# Patient Record
Sex: FEMALE | Race: WHITE | Employment: FULL TIME | URBAN - METROPOLITAN AREA
[De-identification: names, ages, dates, MRNs, and addresses within clinical notes are randomized per-mention and may not be internally consistent; named-entity substitution may affect disease eponyms.]

---

## 2017-11-03 ENCOUNTER — HOSPITAL ENCOUNTER (OUTPATIENT)
Dept: ULTRASOUND IMAGING | Facility: HOSPITAL | Age: 42
Discharge: HOME | End: 2017-11-03
Attending: OBSTETRICS & GYNECOLOGY
Payer: COMMERCIAL

## 2017-11-03 ENCOUNTER — HOSPITAL ENCOUNTER (OUTPATIENT)
Dept: MAMMOGRAPHY | Facility: HOSPITAL | Age: 42
Discharge: HOME | End: 2017-11-03
Attending: OBSTETRICS & GYNECOLOGY
Payer: COMMERCIAL

## 2017-11-03 DIAGNOSIS — N64.52 DISCHARGE FROM RIGHT NIPPLE: ICD-10-CM

## 2017-11-03 PROCEDURE — 76642 ULTRASOUND BREAST LIMITED: CPT | Performed by: OBSTETRICS & GYNECOLOGY

## 2017-11-03 PROCEDURE — 77066 DX MAMMO INCL CAD BI: CPT | Performed by: OBSTETRICS & GYNECOLOGY

## 2018-04-20 ENCOUNTER — HOSPITAL ENCOUNTER (OUTPATIENT)
Dept: ULTRASOUND IMAGING | Facility: HOSPITAL | Age: 43
Discharge: HOME | End: 2018-04-20
Attending: OBSTETRICS & GYNECOLOGY
Payer: COMMERCIAL

## 2018-04-20 ENCOUNTER — HOSPITAL ENCOUNTER (OUTPATIENT)
Dept: MAMMOGRAPHY | Facility: HOSPITAL | Age: 43
Discharge: HOME | End: 2018-04-20
Attending: OBSTETRICS & GYNECOLOGY
Payer: COMMERCIAL

## 2018-04-20 DIAGNOSIS — R92.8 ABNORMAL FINDINGS ON DIAGNOSTIC IMAGING OF BREAST: ICD-10-CM

## 2018-04-20 PROCEDURE — 77066 DX MAMMO INCL CAD BI: CPT | Performed by: OBSTETRICS & GYNECOLOGY

## 2018-04-20 PROCEDURE — 77062 BREAST TOMOSYNTHESIS BI: CPT | Performed by: OBSTETRICS & GYNECOLOGY

## 2018-04-20 PROCEDURE — 76642 ULTRASOUND BREAST LIMITED: CPT | Performed by: OBSTETRICS & GYNECOLOGY

## 2018-04-23 ENCOUNTER — NURSE NAVIGATOR ENCOUNTER (OUTPATIENT)
Dept: HEMATOLOGY/ONCOLOGY | Facility: HOSPITAL | Age: 43
End: 2018-04-23

## 2018-04-23 RX ORDER — ALBUTEROL SULFATE 90 UG/1
AEROSOL, METERED RESPIRATORY (INHALATION) EVERY 6 HOURS PRN
COMMUNITY

## 2018-04-23 RX ORDER — FLUTICASONE PROPIONATE 50 MCG
SPRAY, SUSPENSION (ML) NASAL DAILY
COMMUNITY

## 2018-05-02 ENCOUNTER — HOSPITAL ENCOUNTER (OUTPATIENT)
Dept: MAMMOGRAPHY | Facility: HOSPITAL | Age: 43
Discharge: HOME | End: 2018-05-02
Attending: OBSTETRICS & GYNECOLOGY
Payer: COMMERCIAL

## 2018-05-02 ENCOUNTER — HOSPITAL ENCOUNTER (OUTPATIENT)
Dept: ULTRASOUND IMAGING | Facility: HOSPITAL | Age: 43
Discharge: HOME | End: 2018-05-02
Attending: OBSTETRICS & GYNECOLOGY
Payer: COMMERCIAL

## 2018-05-02 VITALS — SYSTOLIC BLOOD PRESSURE: 127 MMHG | HEART RATE: 60 BPM | RESPIRATION RATE: 16 BRPM | DIASTOLIC BLOOD PRESSURE: 70 MMHG

## 2018-05-02 DIAGNOSIS — N63.20 BREAST MASS, LEFT: ICD-10-CM

## 2018-05-02 PROCEDURE — 77065 DX MAMMO INCL CAD UNI: CPT | Performed by: OBSTETRICS & GYNECOLOGY

## 2018-05-02 PROCEDURE — 88173 CYTOPATH EVAL FNA REPORT: CPT | Performed by: OBSTETRICS & GYNECOLOGY

## 2018-05-02 PROCEDURE — 76942 ECHO GUIDE FOR BIOPSY: CPT | Performed by: OBSTETRICS & GYNECOLOGY

## 2018-05-02 PROCEDURE — 10022 US BREAST FINE NEEDLE ASPIRATION W GUIDE (CPT=10022/76942): CPT | Performed by: OBSTETRICS & GYNECOLOGY

## 2018-05-02 PROCEDURE — 88172 CYTP DX EVAL FNA 1ST EA SITE: CPT | Performed by: OBSTETRICS & GYNECOLOGY

## 2018-05-02 PROCEDURE — 88305 TISSUE EXAM BY PATHOLOGIST: CPT | Performed by: OBSTETRICS & GYNECOLOGY

## 2018-05-02 PROCEDURE — 88177 CYTP FNA EVAL EA ADDL: CPT | Performed by: OBSTETRICS & GYNECOLOGY

## 2018-05-02 NOTE — PROCEDURES
[facility]  Procedure Note    [address] [facility] Patient Status:  Outpatient    3/16/1975 MRN [id number]   Location [address] [facility] Attending Janelle Carbajal MD   Hosp Day # 0 PCP Jamil Francois MD     Procedure: Dez Yang

## 2018-05-02 NOTE — IMAGING NOTE
PT ARRIVED TO ROOM 4.  SCANS BY ALINE [facility]     HX TAKEN PROCEDURE EXPLAINED QUESTIONS ANSWERED  Status post hx of left breast biopsy for benign finding. Pt had a previous hx of right nipple discharge.     PT CONSENTED AT [address] [place] [address],[address] [facility]

## 2018-10-24 ENCOUNTER — HOSPITAL ENCOUNTER (OUTPATIENT)
Dept: MAMMOGRAPHY | Facility: HOSPITAL | Age: 43
Discharge: HOME | End: 2018-10-24
Attending: OBSTETRICS & GYNECOLOGY
Payer: COMMERCIAL

## 2018-10-24 DIAGNOSIS — R92.8 OTH ABN AND INCONCLUSIVE FINDINGS ON DX IMAGING OF BREAST: ICD-10-CM

## 2018-10-24 PROCEDURE — 77062 BREAST TOMOSYNTHESIS BI: CPT | Performed by: OBSTETRICS & GYNECOLOGY

## 2018-10-24 PROCEDURE — 77066 DX MAMMO INCL CAD BI: CPT | Performed by: OBSTETRICS & GYNECOLOGY

## 2019-12-17 ENCOUNTER — HOSPITAL ENCOUNTER (OUTPATIENT)
Dept: MAMMOGRAPHY | Facility: HOSPITAL | Age: 44
Discharge: HOME | End: 2019-12-17
Attending: OBSTETRICS & GYNECOLOGY
Payer: COMMERCIAL

## 2019-12-17 DIAGNOSIS — R92.8 OTHER ABNORMAL AND INCONCLUSIVE FINDINGS ON DIAGNOSTIC IMAGING OF BREAST: ICD-10-CM

## 2019-12-17 PROCEDURE — 77062 BREAST TOMOSYNTHESIS BI: CPT | Performed by: OBSTETRICS & GYNECOLOGY

## 2019-12-17 PROCEDURE — 77066 DX MAMMO INCL CAD BI: CPT | Performed by: OBSTETRICS & GYNECOLOGY

## 2020-09-05 NOTE — PROGRESS NOTES
Breast RN Navigator phoned patient to discuss recommendation for left breast biopsy. Message left 4/23/18 at 0830 requesting a return call at patient's convenience. Return call received from patient, and VMM left at 1022 4/23/18.   Attempted to reach pa signed   that some soreness may occur after biopsy. Discussed use of a supportive bra and ice packs after procedure, to decrease soreness.     Discussed with patient no swimming, bathing, or submerging underwater until the incision is closed and healed, or about 5

## 2021-06-29 ENCOUNTER — OFFICE VISIT (OUTPATIENT)
Dept: NEUROLOGY | Facility: CLINIC | Age: 46
End: 2021-06-29
Payer: COMMERCIAL

## 2021-06-29 VITALS — BODY MASS INDEX: 24.96 KG/M2 | OXYGEN SATURATION: 97 % | HEIGHT: 67 IN | WEIGHT: 159 LBS | HEART RATE: 87 BPM

## 2021-06-29 DIAGNOSIS — F32.A DEPRESSION, UNSPECIFIED DEPRESSION TYPE: ICD-10-CM

## 2021-06-29 DIAGNOSIS — R12 HEARTBURN: ICD-10-CM

## 2021-06-29 DIAGNOSIS — F41.9 ANXIETY: ICD-10-CM

## 2021-06-29 DIAGNOSIS — M54.12 CERVICAL RADICULOPATHY: Primary | ICD-10-CM

## 2021-06-29 DIAGNOSIS — G47.00 INSOMNIA, UNSPECIFIED TYPE: ICD-10-CM

## 2021-06-29 PROBLEM — J30.1 CHRONIC SEASONAL ALLERGIC RHINITIS DUE TO POLLEN: Status: ACTIVE | Noted: 2018-09-13

## 2021-06-29 PROBLEM — J45.909 ASTHMA (HCC): Status: ACTIVE | Noted: 2021-06-29

## 2021-06-29 PROBLEM — J45.909 ASTHMA: Status: ACTIVE | Noted: 2021-06-29

## 2021-06-29 PROCEDURE — 3008F BODY MASS INDEX DOCD: CPT | Performed by: PHYSICAL MEDICINE & REHABILITATION

## 2021-06-29 PROCEDURE — 99244 OFF/OP CNSLTJ NEW/EST MOD 40: CPT | Performed by: PHYSICAL MEDICINE & REHABILITATION

## 2021-06-29 RX ORDER — SERTRALINE HYDROCHLORIDE 100 MG/1
TABLET, FILM COATED ORAL
COMMUNITY
Start: 2021-03-18

## 2021-06-29 RX ORDER — ETODOLAC 500 MG/1
TABLET, FILM COATED ORAL
COMMUNITY
Start: 2021-06-07 | End: 2021-07-21

## 2021-06-29 RX ORDER — DEXAMETHASONE 4 MG/1
TABLET ORAL
COMMUNITY
Start: 2021-03-18

## 2021-06-29 RX ORDER — MONTELUKAST SODIUM 10 MG/1
TABLET ORAL
COMMUNITY

## 2021-06-29 RX ORDER — ALPRAZOLAM 0.25 MG/1
TABLET ORAL
COMMUNITY
Start: 2021-03-18

## 2021-06-29 RX ORDER — CYCLOBENZAPRINE HCL 10 MG
10 TABLET ORAL NIGHTLY
Qty: 30 TABLET | Refills: 0 | Status: SHIPPED | OUTPATIENT
Start: 2021-06-29 | End: 2021-07-21

## 2021-06-29 NOTE — PROGRESS NOTES
[address]  Progress Note    CHIEF COMPLAINT:  Patient presents with:  Neck Pain: New right handed pt. Neck pain started 4/21. Radiates left side N/T to finger digit 1.  Had PT which made it worse, has MRI, n Glasses of wine per week        Comment: 1 x weekly      Drug use: Never      Sexual activity: Not on file      CURRENT MEDICATIONS:   Current Outpatient Medications   Medication Sig Dispense Refill   • cyclobenzaprine 10 MG Oral Tab Take 1 tablet (10 mg t Atraumatic  Extremities: No upper extremity edema bilaterally. Peripheral pulses intact. Spine: limited painfree cervical ROM in all directions, TTP all neck muscles, extension brings on arm pain.   Shoulders: full and painfree ROM   Neuro:   Cognition: al relatively contraindicated due to concurrent psychotropic medication use. 5. Depression, unspecified depression type  As above        RTC:    Return in about 3 weeks (around 7/20/2021).        Discharge Instructions were provided as documented in AVS sum

## 2021-07-08 ENCOUNTER — MED REC SCAN ONLY (OUTPATIENT)
Dept: NEUROLOGY | Facility: CLINIC | Age: 46
End: 2021-07-08

## 2021-07-16 ENCOUNTER — PATIENT MESSAGE (OUTPATIENT)
Dept: NEUROLOGY | Facility: CLINIC | Age: 46
End: 2021-07-16

## 2021-07-19 NOTE — TELEPHONE ENCOUNTER
From: Paige Ramsey  To: Sharon De Luna MD  Sent: 7/16/2021 2:47 PM CDT  Subject: Other    I am scheduled to come in next week. I have been unable to do the physical therapy due to the timing of her first available appointment.  Should I still come in

## 2021-07-21 ENCOUNTER — OFFICE VISIT (OUTPATIENT)
Dept: NEUROLOGY | Facility: CLINIC | Age: 46
End: 2021-07-21
Payer: COMMERCIAL

## 2021-07-21 VITALS
HEIGHT: 67 IN | OXYGEN SATURATION: 98 % | HEART RATE: 117 BPM | DIASTOLIC BLOOD PRESSURE: 70 MMHG | BODY MASS INDEX: 25.11 KG/M2 | SYSTOLIC BLOOD PRESSURE: 128 MMHG | WEIGHT: 160 LBS

## 2021-07-21 DIAGNOSIS — M54.12 CERVICAL RADICULOPATHY: Primary | ICD-10-CM

## 2021-07-21 DIAGNOSIS — R12 HEARTBURN: ICD-10-CM

## 2021-07-21 DIAGNOSIS — F32.A DEPRESSION, UNSPECIFIED DEPRESSION TYPE: ICD-10-CM

## 2021-07-21 DIAGNOSIS — G47.00 INSOMNIA, UNSPECIFIED TYPE: ICD-10-CM

## 2021-07-21 DIAGNOSIS — F41.9 ANXIETY: ICD-10-CM

## 2021-07-21 PROCEDURE — 99214 OFFICE O/P EST MOD 30 MIN: CPT | Performed by: PHYSICAL MEDICINE & REHABILITATION

## 2021-07-21 PROCEDURE — 3078F DIAST BP <80 MM HG: CPT | Performed by: PHYSICAL MEDICINE & REHABILITATION

## 2021-07-21 PROCEDURE — 3008F BODY MASS INDEX DOCD: CPT | Performed by: PHYSICAL MEDICINE & REHABILITATION

## 2021-07-21 PROCEDURE — 3074F SYST BP LT 130 MM HG: CPT | Performed by: PHYSICAL MEDICINE & REHABILITATION

## 2021-07-21 RX ORDER — DOXEPIN HYDROCHLORIDE 10 MG/1
CAPSULE ORAL
Qty: 60 CAPSULE | Refills: 0 | Status: SHIPPED | OUTPATIENT
Start: 2021-07-21 | End: 2021-08-23

## 2021-07-21 RX ORDER — MELOXICAM 15 MG/1
15 TABLET ORAL DAILY
Qty: 30 TABLET | Refills: 0 | Status: SHIPPED | OUTPATIENT
Start: 2021-07-21 | End: 2021-08-23

## 2021-07-21 NOTE — PROGRESS NOTES
[address] Amy Joseph  Progress Note    CHIEF COMPLAINT:  Patient presents with:  Neck Pain: LOV 6/29/21 Pt comes in for f/u appt. Unable to do PT, no injections. Takes Etolac for pain. Has N/T Brought MRI in today Rate History      Not on file    Tobacco Use      Smoking status: Never Smoker      Smokeless tobacco: Never Used    Substance and Sexual Activity      Alcohol use:  Yes        Alcohol/week: 1.0 standard drinks        Types: 1 Glasses of wine per week        Com denies  Tingling/Numbness: admits   Psychiatric             All other systems reviewed and are negative. Pertinent positives and negatives noted in the HPI. PHYSICAL EXAM:   /70   Pulse 117   Ht 67\"   Wt 160 lb (72.6 kg)   SpO2 98%   BMI 25. 0 risks and benefits. 3. Insomnia, unspecified type  Start doxepin, take 1 to 2 tablets and we may increase it next time. Should help radicular pain as well.     4. Anxiety  Tricyclic medications relatively contraindicated due to concurrent psychotropic m

## 2021-07-23 ENCOUNTER — TELEPHONE (OUTPATIENT)
Dept: NEUROLOGY | Facility: CLINIC | Age: 46
End: 2021-07-23

## 2021-07-24 ENCOUNTER — HOSPITAL ENCOUNTER (OUTPATIENT)
Dept: MAMMOGRAPHY | Facility: HOSPITAL | Age: 46
Discharge: HOME | End: 2021-07-24
Attending: OBSTETRICS & GYNECOLOGY
Payer: COMMERCIAL

## 2021-07-24 DIAGNOSIS — Z12.31 ENCOUNTER FOR SCREENING MAMMOGRAM FOR MALIGNANT NEOPLASM OF BREAST: ICD-10-CM

## 2021-07-24 PROCEDURE — 77067 SCR MAMMO BI INCL CAD: CPT | Performed by: OBSTETRICS & GYNECOLOGY

## 2021-07-24 PROCEDURE — 77063 BREAST TOMOSYNTHESIS BI: CPT | Performed by: OBSTETRICS & GYNECOLOGY

## 2021-08-03 ENCOUNTER — MED REC SCAN ONLY (OUTPATIENT)
Dept: NEUROLOGY | Facility: CLINIC | Age: 46
End: 2021-08-03

## 2021-08-23 RX ORDER — DOXEPIN HYDROCHLORIDE 10 MG/1
CAPSULE ORAL
Qty: 60 CAPSULE | Refills: 0 | Status: SHIPPED | OUTPATIENT
Start: 2021-08-23 | End: 2021-09-22

## 2021-08-23 RX ORDER — MELOXICAM 15 MG/1
TABLET ORAL
Qty: 30 TABLET | Refills: 0 | Status: SHIPPED | OUTPATIENT
Start: 2021-08-23

## 2021-08-23 NOTE — TELEPHONE ENCOUNTER
Medication request: meloxicam 15mg Take 1 tablet (15 mg total) by mouth daily.     [place]: 07/21/21 #30 r-0    Medication request: Doxepin 10mg 1-2 tablet orally every night as directed    [place]: 07/21/21 #60 r-0    LOV: 07/21/21  NOV

## 2021-09-15 ENCOUNTER — OFFICE VISIT (OUTPATIENT)
Dept: PHYSICAL MEDICINE AND REHAB | Facility: CLINIC | Age: 46
End: 2021-09-15
Payer: COMMERCIAL

## 2021-09-15 VITALS
BODY MASS INDEX: 25.11 KG/M2 | SYSTOLIC BLOOD PRESSURE: 120 MMHG | WEIGHT: 160 LBS | DIASTOLIC BLOOD PRESSURE: 60 MMHG | HEIGHT: 67 IN

## 2021-09-15 DIAGNOSIS — R12 HEARTBURN: ICD-10-CM

## 2021-09-15 DIAGNOSIS — G47.00 INSOMNIA, UNSPECIFIED TYPE: ICD-10-CM

## 2021-09-15 DIAGNOSIS — M54.12 CERVICAL RADICULOPATHY: Primary | ICD-10-CM

## 2021-09-15 PROCEDURE — 3008F BODY MASS INDEX DOCD: CPT | Performed by: PHYSICAL MEDICINE & REHABILITATION

## 2021-09-15 PROCEDURE — 3078F DIAST BP <80 MM HG: CPT | Performed by: PHYSICAL MEDICINE & REHABILITATION

## 2021-09-15 PROCEDURE — 99214 OFFICE O/P EST MOD 30 MIN: CPT | Performed by: PHYSICAL MEDICINE & REHABILITATION

## 2021-09-15 PROCEDURE — 3074F SYST BP LT 130 MM HG: CPT | Performed by: PHYSICAL MEDICINE & REHABILITATION

## 2021-09-15 NOTE — PROGRESS NOTES
[address] Amy Joseph  Progress Note    CHIEF COMPLAINT:  Patient presents with:  Neck Pain: pt is here for f/u neck pain. LOV 7/21/21. pt states is 95% improvement since been going to PT.  rates pain 2/10.         His Sexual Activity      Alcohol use:  Yes        Alcohol/week: 1.0 standard drink        Types: 1 Glasses of wine per week        Comment: 1 x weekly      Drug use: Never      Sexual activity: Not on file      CURRENT MEDICATIONS:   Current Outpatient [address] [place] [address] Imagin. I reviewed a cervical MRI from May 2021 showing a left C5-6 disc osteophyte causing foraminal and mild central canal stenosis. There is some hypertrophy of the PLL in front of the C6 vertebral body.   No cord signal abnormality, but there is

## 2021-09-17 RX ORDER — MELOXICAM 15 MG/1
TABLET ORAL
Qty: 30 TABLET | Refills: 0 | OUTPATIENT
Start: 2021-09-17

## 2021-09-17 NOTE — TELEPHONE ENCOUNTER
Medication request: MELOXICAM 15 MG Oral Tab  Sig:   TAKE 1 TABLET BY MOUTH EVERY DAY    LOV: 9/15/21  NOV: None    [place] 8/23/21 qty#30 r-0

## 2021-09-21 NOTE — TELEPHONE ENCOUNTER
Medication request: Doxepin 10mg 1-2 tablet orally every night as directed     [place]: 8/23/21 qty#60 r-0     LOV: 9/15/21  NOV: None

## 2021-09-22 RX ORDER — DOXEPIN HYDROCHLORIDE 10 MG/1
CAPSULE ORAL
Qty: 60 CAPSULE | Refills: 0 | Status: SHIPPED | OUTPATIENT
Start: 2021-09-22 | End: 2021-12-13

## 2021-12-13 RX ORDER — DOXEPIN HYDROCHLORIDE 10 MG/1
CAPSULE ORAL NIGHTLY
Qty: 60 CAPSULE | Refills: 1 | Status: SHIPPED | OUTPATIENT
Start: 2021-12-13

## 2021-12-13 NOTE — TELEPHONE ENCOUNTER
Medication request: DOXEPIN 10 MG Oral Cap  Si-2 TABLET ORALLY EVERY NIGHT AS DIRECTED    LOV:9/15/2021  NOV: none    [place]2021  Q-60 R-0

## 2022-03-04 ENCOUNTER — TELEPHONE (OUTPATIENT)
Dept: PHYSICAL MEDICINE AND REHAB | Facility: CLINIC | Age: 47
End: 2022-03-04

## 2023-10-16 ENCOUNTER — OFFICE VISIT (OUTPATIENT)
Dept: INTERNAL MEDICINE CLINIC | Facility: CLINIC | Age: 48
End: 2023-10-16

## 2023-10-16 VITALS
TEMPERATURE: 99 F | HEIGHT: 67 IN | DIASTOLIC BLOOD PRESSURE: 82 MMHG | WEIGHT: 173 LBS | OXYGEN SATURATION: 98 % | HEART RATE: 75 BPM | BODY MASS INDEX: 27.15 KG/M2 | SYSTOLIC BLOOD PRESSURE: 136 MMHG

## 2023-10-16 DIAGNOSIS — E53.8 B12 DEFICIENCY: ICD-10-CM

## 2023-10-16 DIAGNOSIS — Z00.00 ANNUAL PHYSICAL EXAM: Primary | ICD-10-CM

## 2023-10-16 DIAGNOSIS — Z12.11 SCREENING FOR COLON CANCER: ICD-10-CM

## 2023-10-16 DIAGNOSIS — Z12.31 ENCOUNTER FOR SCREENING MAMMOGRAM FOR MALIGNANT NEOPLASM OF BREAST: ICD-10-CM

## 2023-10-16 PROCEDURE — 99386 PREV VISIT NEW AGE 40-64: CPT | Performed by: INTERNAL MEDICINE

## 2023-10-16 PROCEDURE — 90686 IIV4 VACC NO PRSV 0.5 ML IM: CPT | Performed by: INTERNAL MEDICINE

## 2023-10-16 PROCEDURE — 3008F BODY MASS INDEX DOCD: CPT | Performed by: INTERNAL MEDICINE

## 2023-10-16 PROCEDURE — 3079F DIAST BP 80-89 MM HG: CPT | Performed by: INTERNAL MEDICINE

## 2023-10-16 PROCEDURE — 90471 IMMUNIZATION ADMIN: CPT | Performed by: INTERNAL MEDICINE

## 2023-10-16 PROCEDURE — 3075F SYST BP GE 130 - 139MM HG: CPT | Performed by: INTERNAL MEDICINE

## 2023-10-16 RX ORDER — BUPROPION HCL 300 MG
1 TABLET, EXTENDED RELEASE 24 HR ORAL DAILY
COMMUNITY
Start: 2023-04-01

## 2023-10-16 RX ORDER — ALPRAZOLAM 0.25 MG/1
0.25 TABLET ORAL NIGHTLY PRN
Qty: 30 TABLET | Refills: 0 | Status: SHIPPED | OUTPATIENT
Start: 2023-10-16

## 2023-11-27 ENCOUNTER — PATIENT MESSAGE (OUTPATIENT)
Dept: INTERNAL MEDICINE CLINIC | Facility: CLINIC | Age: 48
End: 2023-11-27

## 2023-11-28 NOTE — TELEPHONE ENCOUNTER
From: Francisco Bradley  To: Shoshana Herron  Sent: 11/27/2023 2:07 PM CST  Subject: Scheduling bloodwork    I am supposed to get bloodwork done but I can't tell how to do that. Where do I go? Do I need an appointment?

## 2023-11-28 NOTE — TELEPHONE ENCOUNTER
Medications listedd as external. Clarification sent regarding sig. Await response.     Future Appointments   Date Time Provider Wilfredo Rosemarie   3/5/2024 11:40 AM [facility] RM1 [facility] EM [facility]

## 2023-11-28 NOTE — TELEPHONE ENCOUNTER
From: Josh Ernst  To: Treasuremichi Arshad  Sent: 11/27/2023 10:34 AM CST  Subject: Medication refills    My son broke my only remaining albuterol inhaler this weekend. I have been coughing a lot, which is usual for me when the weather is cold and dry. It is not offering it as a refill option on the Refill medications page. I could also use a refill on the Flovent though that is less urgent. I generally only take that in later Fall until SPring.

## 2023-11-30 ENCOUNTER — TELEPHONE (OUTPATIENT)
Dept: INTERNAL MEDICINE CLINIC | Facility: CLINIC | Age: 48
End: 2023-11-30

## 2023-11-30 ENCOUNTER — PATIENT OUTREACH (OUTPATIENT)
Dept: CASE MANAGEMENT | Age: 48
End: 2023-11-30

## 2023-11-30 RX ORDER — ALBUTEROL SULFATE 90 UG/1
2 AEROSOL, METERED RESPIRATORY (INHALATION)
Qty: 18 G | Refills: 3 | Status: CANCELLED | OUTPATIENT
Start: 2023-11-30

## 2023-11-30 NOTE — TELEPHONE ENCOUNTER
PCP showing Dr Richard Crump. RN generated PCP change request per protocol.      DR Karen Santos=new PCP

## 2023-11-30 NOTE — PROCEDURES
Received order requesting to update PCP to Dr. Yessy Lazo is Approved and finalized on November 30, 2023.     Thanks,  [facility]

## 2023-11-30 NOTE — TELEPHONE ENCOUNTER
Patient would like to know how to schedule for colonoscopy. Instructed patient to call [phone](MVN below ). See OTHER ORDERS 10/16/23; Referral Information:  Order Date: Oct 16, 2023  Expected Date: 10/23/2023  Expiration Date: Oct 15, 2024 Epic Order #: [id number]   Referral Type: [facility] Telephone Colon Screen Dx: Screening for colon cancer (Z12.11)   Signed Referral [place]:           Number of Visits: 1    Scheduling Information:  Please call [phone] to schedule a telephone screening appointment prior to your procedure.

## 2023-11-30 NOTE — TELEPHONE ENCOUNTER
Passes protocol but listed as External/historical, per patient both were prescribed by previous PCP . Refill passed per [place] [employer], [facility] protocol. Requested Prescriptions   Pending Prescriptions Disp Refills    fluticasone propionate (FLOVENT HFA) 110 MCG/ACT Inhalation Aerosol 12 g 3     Sig: TAKE 2 PUFFS BY MOUTH TWICE A DAY       Asthma & COPD Medication Protocol Passed - 11/30/2023  3:11 PM        Passed - In person appointment or virtual visit in the past 6 mos or appointment in next 3 mos     Recent Outpatient Visits              1 month ago Annual physical exam    [facility], Milton Campbell MD    Office Visit    2 years ago Cervical radiculopathy    [facility], Cristian Miranda MD    Office Visit    2 years ago Cervical radiculopathy    [facility], Amina Hatfield MD    Office Visit    2 years ago Cervical radiculopathy    [address] [facility], Cristian Miranda MD    Office Visit          Future Appointments         Provider Department Appt Notes    Tomorrow Chance [address] Dr. has submitted full list in [address]    In 3 months [facility] OF THE [place] [address] [address] [place] [address]                  albuterol 108 (90 Base) MCG/ACT Inhalation Aero Soln 18 g 3     Sig: Inhale 2 puffs into the lungs every 4 to 6 hours as needed for Wheezing.        Asthma & COPD Medication Protocol Passed - 11/30/2023  3:11 PM        Passed - In person appointment or virtual visit in the past 6 mos or appointment in next 3 mos     Recent Outpatient Visits              1 month ago Annual physical exam    [facility], Milton Campbell MD    Office Visit    2 years ago Cervical radiculopathy    [address] [facility], Cristian Miranda MD    Office Visit    2 years ago Cervical radiculopathy    Ruben Kumar Benjie Gustin, MD    Office Visit    2 years ago Cervical radiculopathy    Ruben Kumar Benjie Gustin, MD    Office Visit          Future Appointments         Provider Department Appt Notes    Tomorrow Carroll [address] Dr[address] has submitted full list in [address]    In 3 months [facility] OF THE [facility] [address] [facility]                      Future Appointments         Provider Department Appt Notes    Tomorrow Sunita [address] Dr[address] has submitted full list in [address]    In 3 months [facility] OF THE [facility] [address]              Recent Outpatient Visits              1 month ago Annual physical exam    Loli Kumar MD    Office Visit    2 years ago Cervical radiculopathy    Niki Kumar MD    Office Visit    2 years ago Cervical radiculopathy    Niki Kumar MD    Office Visit    2 years ago Cervical radiculopathy    Ruben Kumar Benjie Gustin, MD    Office Visit

## 2023-11-30 NOTE — TELEPHONE ENCOUNTER
Patient called back, confirmed that the albuterol inhaler is 2 puffs every 4-6 hrs as needed. Stated that these 2 prescriptions (albuterol and Flovent ) were both prescribed to her by her previous PCP. And her new PCP now is Dr Michael Pierson. Preferred pharmacy verified. RN heard frequent cough over the phone,stated that she has the cough whenever she talks too much ,  advised UC for worsening symptoms.

## 2023-12-01 ENCOUNTER — LAB ENCOUNTER (OUTPATIENT)
Dept: LAB | Age: 48
End: 2023-12-01
Attending: INTERNAL MEDICINE
Payer: COMMERCIAL

## 2023-12-01 ENCOUNTER — PATIENT MESSAGE (OUTPATIENT)
Dept: INTERNAL MEDICINE CLINIC | Facility: CLINIC | Age: 48
End: 2023-12-01

## 2023-12-01 ENCOUNTER — TELEPHONE (OUTPATIENT)
Dept: INTERNAL MEDICINE CLINIC | Facility: CLINIC | Age: 48
End: 2023-12-01

## 2023-12-01 DIAGNOSIS — E53.8 B12 DEFICIENCY: ICD-10-CM

## 2023-12-01 DIAGNOSIS — Z00.00 ANNUAL PHYSICAL EXAM: ICD-10-CM

## 2023-12-01 LAB
ALBUMIN SERPL-MCNC: 4.4 G/DL (ref 3.2–4.8)
ALBUMIN/GLOB SERPL: 1.7 {RATIO} (ref 1–2)
ALP LIVER SERPL-CCNC: 78 U/L
ALT SERPL-CCNC: 10 U/L
ANION GAP SERPL CALC-SCNC: 6 MMOL/L (ref 0–18)
AST SERPL-CCNC: 14 U/L (ref ?–34)
BASOPHILS # BLD AUTO: 0.02 X10(3) UL (ref 0–0.2)
BASOPHILS NFR BLD AUTO: 0.3 %
BILIRUB SERPL-MCNC: 1.7 MG/DL (ref 0.3–1.2)
BUN BLD-MCNC: 10 MG/DL (ref 9–23)
BUN/CREAT SERPL: 13.3 (ref 10–20)
CALCIUM BLD-MCNC: 9.2 MG/DL (ref 8.7–10.4)
CHLORIDE SERPL-SCNC: 106 MMOL/L (ref 98–112)
CHOLEST SERPL-MCNC: 141 MG/DL (ref ?–200)
CO2 SERPL-SCNC: 26 MMOL/L (ref 21–32)
CREAT BLD-MCNC: 0.75 MG/DL
DEPRECATED RDW RBC AUTO: 40.3 FL (ref 35.1–46.3)
EGFRCR SERPLBLD CKD-EPI 2021: 99 ML/MIN/1.73M2 (ref 60–?)
EOSINOPHIL # BLD AUTO: 0.13 X10(3) UL (ref 0–0.7)
EOSINOPHIL NFR BLD AUTO: 1.7 %
ERYTHROCYTE [DISTWIDTH] IN BLOOD BY AUTOMATED COUNT: 12.3 % (ref 11–15)
FASTING PATIENT LIPID ANSWER: YES
FASTING STATUS PATIENT QL REPORTED: YES
GLOBULIN PLAS-MCNC: 2.6 G/DL (ref 2.8–4.4)
GLUCOSE BLD-MCNC: 86 MG/DL (ref 70–99)
HCT VFR BLD AUTO: 40.3 %
HDLC SERPL-MCNC: 43 MG/DL (ref 40–59)
HGB BLD-MCNC: 13.7 G/DL
IMM GRANULOCYTES # BLD AUTO: 0.02 X10(3) UL (ref 0–1)
IMM GRANULOCYTES NFR BLD: 0.3 %
LDLC SERPL CALC-MCNC: 86 MG/DL (ref ?–100)
LYMPHOCYTES # BLD AUTO: 0.75 X10(3) UL (ref 1–4)
LYMPHOCYTES NFR BLD AUTO: 9.7 %
MCH RBC QN AUTO: 31.4 PG (ref 26–34)
MCHC RBC AUTO-ENTMCNC: 34 G/DL (ref 31–37)
MCV RBC AUTO: 92.2 FL
MONOCYTES # BLD AUTO: 0.7 X10(3) UL (ref 0.1–1)
MONOCYTES NFR BLD AUTO: 9 %
NEUTROPHILS # BLD AUTO: 6.15 X10 (3) UL (ref 1.5–7.7)
NEUTROPHILS # BLD AUTO: 6.15 X10(3) UL (ref 1.5–7.7)
NEUTROPHILS NFR BLD AUTO: 79 %
NONHDLC SERPL-MCNC: 98 MG/DL (ref ?–130)
OSMOLALITY SERPL CALC.SUM OF ELEC: 284 MOSM/KG (ref 275–295)
PLATELET # BLD AUTO: 200 10(3)UL (ref 150–450)
POTASSIUM SERPL-SCNC: 4 MMOL/L (ref 3.5–5.1)
PROT SERPL-MCNC: 7 G/DL (ref 5.7–8.2)
RBC # BLD AUTO: 4.37 X10(6)UL
SODIUM SERPL-SCNC: 138 MMOL/L (ref 136–145)
TRIGL SERPL-MCNC: 59 MG/DL (ref 30–149)
TSI SER-ACNC: 2.11 MIU/ML (ref 0.55–4.78)
VIT B12 SERPL-MCNC: 245 PG/ML (ref 211–911)
VIT D+METAB SERPL-MCNC: 13.2 NG/ML (ref 30–100)
VLDLC SERPL CALC-MCNC: 9 MG/DL (ref 0–30)
WBC # BLD AUTO: 7.8 X10(3) UL (ref 4–11)

## 2023-12-01 PROCEDURE — 80053 COMPREHEN METABOLIC PANEL: CPT

## 2023-12-01 PROCEDURE — 85025 COMPLETE CBC W/AUTO DIFF WBC: CPT

## 2023-12-01 PROCEDURE — 84443 ASSAY THYROID STIM HORMONE: CPT

## 2023-12-01 PROCEDURE — 80061 LIPID PANEL: CPT

## 2023-12-01 PROCEDURE — 36415 COLL VENOUS BLD VENIPUNCTURE: CPT

## 2023-12-01 PROCEDURE — 82607 VITAMIN B-12: CPT

## 2023-12-01 PROCEDURE — 82306 VITAMIN D 25 HYDROXY: CPT

## 2023-12-01 RX ORDER — FLUTICASONE PROPIONATE 110 UG/1
AEROSOL, METERED RESPIRATORY (INHALATION)
Qty: 12 G | Refills: 3 | Status: SHIPPED | OUTPATIENT
Start: 2023-12-01 | End: 2023-12-04 | Stop reason: ALTCHOICE

## 2023-12-01 NOTE — TELEPHONE ENCOUNTER
Current Outpatient Medications:       fluticasone propionate (FLOVENT HFA) 110 MCG/ACT Inhalation Aerosol, TAKE 2 PUFFS BY MOUTH TWICE A DAY, Disp: 12 g, Rfl: 3 REFILL

## 2023-12-02 NOTE — TELEPHONE ENCOUNTER
From: Sonya Louie  To: Tabitha Martinez  Sent: 12/1/2023 7:47 PM CST  Subject: Lab results    I was looking through the lab results and noticed a few things that mentioned autoimmune issues. It reminded me that I did an CED test years ago that came out at 1:640. She tested for all of the more specific things next and said it was inconclusive. But Complement C4 and C3 were both only a few points above the bottom of the normal range. I have all of this in my [facility] chart and could probably figure out a way to transfer it.

## 2023-12-04 NOTE — TELEPHONE ENCOUNTER
She had C3 and C4 level checked in 2008 and they were within normal range , not sure what there labs she is talking , not sure why they check complement level

## 2023-12-07 ENCOUNTER — NURSE ONLY (OUTPATIENT)
Dept: INTERNAL MEDICINE CLINIC | Facility: CLINIC | Age: 48
End: 2023-12-07

## 2023-12-07 DIAGNOSIS — E53.8 B12 DEFICIENCY: Primary | ICD-10-CM

## 2023-12-07 PROCEDURE — 96372 THER/PROPH/DIAG INJ SC/IM: CPT | Performed by: INTERNAL MEDICINE

## 2023-12-07 RX ORDER — CYANOCOBALAMIN 1000 UG/ML
1000 INJECTION, SOLUTION INTRAMUSCULAR; SUBCUTANEOUS ONCE
Status: COMPLETED | OUTPATIENT
Start: 2023-12-07 | End: 2023-12-07

## 2023-12-07 RX ADMIN — CYANOCOBALAMIN 1000 MCG: 1000 INJECTION, SOLUTION INTRAMUSCULAR; SUBCUTANEOUS at 16:12:00

## 2023-12-07 NOTE — PROGRESS NOTES
Order was verified, along with patient's name, date of birth and injection received. Patient was given the Vitamin B12 injection in the left  deltoid and patient tolerated the injection well.

## 2023-12-14 ENCOUNTER — NURSE ONLY (OUTPATIENT)
Dept: INTERNAL MEDICINE CLINIC | Facility: CLINIC | Age: 48
End: 2023-12-14
Payer: COMMERCIAL

## 2023-12-14 DIAGNOSIS — E53.8 B12 DEFICIENCY: Primary | ICD-10-CM

## 2023-12-14 PROCEDURE — 96372 THER/PROPH/DIAG INJ SC/IM: CPT | Performed by: INTERNAL MEDICINE

## 2023-12-14 RX ADMIN — CYANOCOBALAMIN 1000 MCG: 1000 INJECTION, SOLUTION INTRAMUSCULAR; SUBCUTANEOUS at 17:00:00

## 2023-12-15 RX ORDER — CYANOCOBALAMIN 1000 UG/ML
1000 INJECTION, SOLUTION INTRAMUSCULAR; SUBCUTANEOUS ONCE
Status: COMPLETED | OUTPATIENT
Start: 2023-12-15 | End: 2023-12-14

## 2023-12-19 ENCOUNTER — NURSE ONLY (OUTPATIENT)
Dept: INTERNAL MEDICINE CLINIC | Facility: CLINIC | Age: 48
End: 2023-12-19
Payer: COMMERCIAL

## 2023-12-19 DIAGNOSIS — E53.8 B12 DEFICIENCY: Primary | ICD-10-CM

## 2023-12-19 PROCEDURE — 96372 THER/PROPH/DIAG INJ SC/IM: CPT | Performed by: INTERNAL MEDICINE

## 2023-12-19 RX ADMIN — CYANOCOBALAMIN 1000 MCG: 1000 INJECTION, SOLUTION INTRAMUSCULAR; SUBCUTANEOUS at 16:00:00

## 2023-12-20 RX ORDER — CYANOCOBALAMIN 1000 UG/ML
1000 INJECTION, SOLUTION INTRAMUSCULAR; SUBCUTANEOUS ONCE
Status: COMPLETED | OUTPATIENT
Start: 2023-12-20 | End: 2023-12-19

## 2023-12-26 ENCOUNTER — TELEPHONE (OUTPATIENT)
Dept: OBGYN | Age: 48
End: 2023-12-26

## 2024-01-03 ENCOUNTER — NURSE ONLY (OUTPATIENT)
Dept: INTERNAL MEDICINE CLINIC | Facility: CLINIC | Age: 49
End: 2024-01-03

## 2024-01-03 DIAGNOSIS — E53.8 B12 DEFICIENCY: Primary | ICD-10-CM

## 2024-01-03 PROCEDURE — 96372 THER/PROPH/DIAG INJ SC/IM: CPT | Performed by: INTERNAL MEDICINE

## 2024-01-03 RX ORDER — CYANOCOBALAMIN 1000 UG/ML
1000 INJECTION, SOLUTION INTRAMUSCULAR; SUBCUTANEOUS ONCE
Status: COMPLETED | OUTPATIENT
Start: 2024-01-03 | End: 2024-01-03

## 2024-01-03 RX ADMIN — CYANOCOBALAMIN 1000 MCG: 1000 INJECTION, SOLUTION INTRAMUSCULAR; SUBCUTANEOUS at 17:26:00

## 2024-01-11 ENCOUNTER — NURSE TRIAGE (OUTPATIENT)
Dept: INTERNAL MEDICINE CLINIC | Facility: CLINIC | Age: 49
End: 2024-01-11

## 2024-01-11 ENCOUNTER — OFFICE VISIT (OUTPATIENT)
Dept: INTERNAL MEDICINE CLINIC | Facility: CLINIC | Age: 49
End: 2024-01-11

## 2024-01-11 VITALS
HEIGHT: 67 IN | WEIGHT: 162 LBS | HEART RATE: 90 BPM | BODY MASS INDEX: 25.43 KG/M2 | SYSTOLIC BLOOD PRESSURE: 122 MMHG | OXYGEN SATURATION: 96 % | DIASTOLIC BLOOD PRESSURE: 85 MMHG

## 2024-01-11 DIAGNOSIS — N39.0 RECURRENT UTI: ICD-10-CM

## 2024-01-11 DIAGNOSIS — R35.0 FREQUENT URINATION: Primary | ICD-10-CM

## 2024-01-11 DIAGNOSIS — J06.9 UPPER RESPIRATORY INFECTION WITH COUGH AND CONGESTION: ICD-10-CM

## 2024-01-11 LAB
APPEARANCE: CLEAR
BILIRUBIN: NEGATIVE
GLUCOSE (URINE DIPSTICK): NEGATIVE MG/DL
KETONES (URINE DIPSTICK): NEGATIVE MG/DL
MULTISTIX LOT#: ABNORMAL NUMERIC
NITRITE, URINE: NEGATIVE
PH, URINE: 8 (ref 4.5–8)
SPECIFIC GRAVITY: 1.01 (ref 1–1.03)
UROBILINOGEN,SEMI-QN: 0.2 MG/DL (ref 0–1.9)

## 2024-01-11 PROCEDURE — 3008F BODY MASS INDEX DOCD: CPT

## 2024-01-11 PROCEDURE — 3074F SYST BP LT 130 MM HG: CPT

## 2024-01-11 PROCEDURE — 81002 URINALYSIS NONAUTO W/O SCOPE: CPT

## 2024-01-11 PROCEDURE — 3079F DIAST BP 80-89 MM HG: CPT

## 2024-01-11 PROCEDURE — 99214 OFFICE O/P EST MOD 30 MIN: CPT

## 2024-01-11 RX ORDER — BENZONATATE 100 MG/1
100 CAPSULE ORAL 2 TIMES DAILY PRN
Qty: 20 CAPSULE | Refills: 0 | Status: SHIPPED | OUTPATIENT
Start: 2024-01-11

## 2024-01-11 NOTE — PROGRESS NOTES
Subjective:   Jennifer Bah is a 48 year old female who presents for Urinary (Has been having UTI symptoms for 4 days)     Symptoms started Monday night - frequency and burning, started drinking lots of water and taking methenamine OTC - felt better Tuesday but symptoms returned Wednesday  Feels pelvic tenderness and fullness, having to squeeze to get urine out and feels residual bladder fullness after voiding  Feels a strange wave over her body as she goes to the bathroom  No fevers or chills  Has had two UTIs in the past 3 months - same symptoms but not as severe this time  Was on Bactrim and Nitrofurantoin - symptoms completely resolved each time and then returned  In between UTIs she had vaginal irritation after using a new vaginal wash, no vaginal symptoms now  Prior to this no history of frequent UTIs or kidney stones   No change to diet or medications except taking sudafed for cough/congestion and probiotics recently   No alcohol for past few months  Drinks 2 cups of tea a day    Has been feeling menopausal symptoms, lack of libido, frequent irritability, overall more warm but no hot flashes      History/Other:    Chief Complaint Reviewed and Verified  Nursing Notes Reviewed and   Verified  Tobacco Reviewed  Allergies Reviewed  Medications Reviewed    OB Status Reviewed         Tobacco:  She has never smoked tobacco.    Current Outpatient Medications   Medication Sig Dispense Refill    benzonatate 100 MG Oral Cap Take 1 capsule (100 mg total) by mouth 2 (two) times daily as needed for cough. 20 capsule 0    Beclomethasone Diprop HFA 40 MCG/ACT Inhalation Aerosol, Breath Activated Inhale 40 mcg into the lungs daily. 1 each 1    albuterol 108 (90 Base) MCG/ACT Inhalation Aero Soln Inhale 1 puff into the lungs every 6 (six) hours as needed for Wheezing. 1 each 1    WELLBUTRIN  MG Oral Tablet 24 Hr Take 1 tablet (300 mg total) by mouth daily.      ALPRAZolam 0.25 MG Oral Tab Take 1 tablet (0.25 mg  total) by mouth nightly as needed. 30 tablet 0     Review of Systems:  Review of Systems   Constitutional: Negative.    Respiratory: Negative.     Cardiovascular: Negative.    Gastrointestinal: Negative.    Genitourinary:  Positive for decreased urine volume, dysuria, frequency and pelvic pain.   Skin: Negative.    Neurological: Negative.      Objective:   /85   Pulse 90   Ht 5' 7\" (1.702 m)   Wt 162 lb (73.5 kg)   SpO2 96%   BMI 25.37 kg/m²  Estimated body mass index is 25.37 kg/m² as calculated from the following:    Height as of this encounter: 5' 7\" (1.702 m).    Weight as of this encounter: 162 lb (73.5 kg).  Physical Exam  Vitals reviewed.   Constitutional:       General: She is not in acute distress.     Appearance: Normal appearance. She is well-developed.   Cardiovascular:      Rate and Rhythm: Normal rate and regular rhythm.      Heart sounds: Normal heart sounds.   Pulmonary:      Effort: Pulmonary effort is normal.      Breath sounds: Normal breath sounds.   Abdominal:      General: Bowel sounds are normal.      Palpations: Abdomen is soft.      Tenderness: There is no right CVA tenderness or left CVA tenderness.   Skin:     General: Skin is warm and dry.   Neurological:      Mental Status: She is alert and oriented to person, place, and time.       Assessment & Plan:   1. Frequent urination (Primary)  -     URINALYSIS NONAUTO W/O SCOPE  -     Urology Referral - In [facility] K[facility]NEY/BLADDER (CPT=76770); Future; Expected date: 01/11/2024  -     Urine Culture, Routine; Future; Expected date: 01/11/2024  -     Urine Culture, Routine  2. Recurrent UTI  -     Urology Referral - In [facility] KIDNEY/BLADDER (CPT=76770); Future; Expected date: 01/11/2024  -     Urine Culture, Routine; Future; Expected date: 01/11/2024  -     Urine Culture, Routine  3. Upper respiratory infection with cough and congestion  -     Benzonatate; Take 1 capsule (100 mg total) by mouth 2 (two) times daily as  needed for cough.  Dispense: 20 capsule; Refill: 0      RAEANN Raymundo, 1/11/2024, 10:41 AM

## 2024-01-11 NOTE — TELEPHONE ENCOUNTER
Action Requested: Summary for Provider     []  Critical Lab, Recommendations Needed  [] Need Additional Advice  []   FYI    []   Need Orders  [] Need Medications Sent to Pharmacy  []  Other     SUMMARY: Per protocol disposition advised  See Today in Office. Appointment scheduled:  Future Appointments   Date Time Provider Department Center   1/11/2024 10:30 AM Aline Barrios APRN [facility]   3/5/2024 11:40 AM [facility] RM[facility]     Reason for call: Painful Urination  Onset: 3 days ago    Symptoms: urinary urgency, frequency, burning with urination  Took over-the-counter test which showed nitrites, no leukocytes  Treatment: over-the-counter medication to treat symptoms (unsure of name), pushing fluids    Reason for Disposition   Urinating more frequently than usual (i.e., frequency)    Protocols used: Urinary Symptoms-A-OH

## 2024-01-16 ENCOUNTER — LAB ENCOUNTER (OUTPATIENT)
Dept: LAB | Age: 49
End: 2024-01-16
Attending: INTERNAL MEDICINE
Payer: COMMERCIAL

## 2024-01-16 ENCOUNTER — OFFICE VISIT (OUTPATIENT)
Dept: INTERNAL MEDICINE CLINIC | Facility: CLINIC | Age: 49
End: 2024-01-16

## 2024-01-16 ENCOUNTER — NURSE TRIAGE (OUTPATIENT)
Dept: INTERNAL MEDICINE CLINIC | Facility: CLINIC | Age: 49
End: 2024-01-16

## 2024-01-16 ENCOUNTER — PATIENT MESSAGE (OUTPATIENT)
Dept: INTERNAL MEDICINE CLINIC | Facility: CLINIC | Age: 49
End: 2024-01-16

## 2024-01-16 VITALS
HEIGHT: 67 IN | DIASTOLIC BLOOD PRESSURE: 86 MMHG | WEIGHT: 161.38 LBS | HEART RATE: 81 BPM | SYSTOLIC BLOOD PRESSURE: 126 MMHG | BODY MASS INDEX: 25.33 KG/M2 | OXYGEN SATURATION: 98 %

## 2024-01-16 DIAGNOSIS — R31.9 HEMATURIA, UNSPECIFIED TYPE: Primary | ICD-10-CM

## 2024-01-16 DIAGNOSIS — N39.0 ACUTE UTI: ICD-10-CM

## 2024-01-16 DIAGNOSIS — R77.1 ABNORMALITY OF GLOBULIN: ICD-10-CM

## 2024-01-16 DIAGNOSIS — R63.4 WEIGHT LOSS: ICD-10-CM

## 2024-01-16 LAB
ALBUMIN SERPL-MCNC: 4.6 G/DL (ref 3.2–4.8)
ALBUMIN/GLOB SERPL: 1.8 {RATIO} (ref 1–2)
ALP LIVER SERPL-CCNC: 76 U/L
ALT SERPL-CCNC: 12 U/L
ANION GAP SERPL CALC-SCNC: 0 MMOL/L (ref 0–18)
AST SERPL-CCNC: 16 U/L (ref ?–34)
BILIRUB SERPL-MCNC: 1.1 MG/DL (ref 0.3–1.2)
BILIRUBIN: NEGATIVE
BUN BLD-MCNC: 8 MG/DL (ref 9–23)
BUN/CREAT SERPL: 10.5 (ref 10–20)
CALCIUM BLD-MCNC: 9.3 MG/DL (ref 8.7–10.4)
CHLORIDE SERPL-SCNC: 105 MMOL/L (ref 98–112)
CO2 SERPL-SCNC: 30 MMOL/L (ref 21–32)
CREAT BLD-MCNC: 0.76 MG/DL
DEPRECATED RDW RBC AUTO: 39.1 FL (ref 35.1–46.3)
EGFRCR SERPLBLD CKD-EPI 2021: 98 ML/MIN/1.73M2 (ref 60–?)
ERYTHROCYTE [DISTWIDTH] IN BLOOD BY AUTOMATED COUNT: 12 % (ref 11–15)
FASTING STATUS PATIENT QL REPORTED: YES
GLOBULIN PLAS-MCNC: 2.6 G/DL (ref 2.8–4.4)
GLUCOSE (URINE DIPSTICK): NEGATIVE MG/DL
GLUCOSE BLD-MCNC: 85 MG/DL (ref 70–99)
HCT VFR BLD AUTO: 45.5 %
HGB BLD-MCNC: 14.9 G/DL
KETONES (URINE DIPSTICK): NEGATIVE MG/DL
MCH RBC QN AUTO: 29.2 PG (ref 26–34)
MCHC RBC AUTO-ENTMCNC: 32.7 G/DL (ref 31–37)
MCV RBC AUTO: 89 FL
MULTISTIX LOT#: ABNORMAL NUMERIC
NITRITE, URINE: NEGATIVE
OSMOLALITY SERPL CALC.SUM OF ELEC: 278 MOSM/KG (ref 275–295)
PH, URINE: 6.5 (ref 4.5–8)
PLATELET # BLD AUTO: 256 10(3)UL (ref 150–450)
POTASSIUM SERPL-SCNC: 4.4 MMOL/L (ref 3.5–5.1)
PROT SERPL-MCNC: 7.2 G/DL (ref 5.7–8.2)
PROT UR-MCNC: 231 MG/DL (ref ?–14)
PROTEIN (URINE DIPSTICK): 100 MG/DL
RBC # BLD AUTO: 5.11 X10(6)UL
SODIUM SERPL-SCNC: 135 MMOL/L (ref 136–145)
SPECIFIC GRAVITY: 1 (ref 1–1.03)
UROBILINOGEN,SEMI-QN: 0.2 MG/DL (ref 0–1.9)
WBC # BLD AUTO: 10.7 X10(3) UL (ref 4–11)

## 2024-01-16 PROCEDURE — 80053 COMPREHEN METABOLIC PANEL: CPT

## 2024-01-16 PROCEDURE — 84165 PROTEIN E-PHORESIS SERUM: CPT

## 2024-01-16 PROCEDURE — 3008F BODY MASS INDEX DOCD: CPT | Performed by: INTERNAL MEDICINE

## 2024-01-16 PROCEDURE — 86335 IMMUNFIX E-PHORSIS/URINE/CSF: CPT

## 2024-01-16 PROCEDURE — 3079F DIAST BP 80-89 MM HG: CPT | Performed by: INTERNAL MEDICINE

## 2024-01-16 PROCEDURE — 85027 COMPLETE CBC AUTOMATED: CPT

## 2024-01-16 PROCEDURE — 86334 IMMUNOFIX E-PHORESIS SERUM: CPT

## 2024-01-16 PROCEDURE — 81002 URINALYSIS NONAUTO W/O SCOPE: CPT | Performed by: INTERNAL MEDICINE

## 2024-01-16 PROCEDURE — 3074F SYST BP LT 130 MM HG: CPT | Performed by: INTERNAL MEDICINE

## 2024-01-16 PROCEDURE — 36415 COLL VENOUS BLD VENIPUNCTURE: CPT

## 2024-01-16 PROCEDURE — 84156 ASSAY OF PROTEIN URINE: CPT

## 2024-01-16 PROCEDURE — 99214 OFFICE O/P EST MOD 30 MIN: CPT | Performed by: INTERNAL MEDICINE

## 2024-01-16 PROCEDURE — 84166 PROTEIN E-PHORESIS/URINE/CSF: CPT

## 2024-01-16 PROCEDURE — 83521 IG LIGHT CHAINS FREE EACH: CPT

## 2024-01-16 RX ORDER — SULFAMETHOXAZOLE AND TRIMETHOPRIM 800; 160 MG/1; MG/1
1 TABLET ORAL 2 TIMES DAILY
Qty: 10 TABLET | Refills: 0 | Status: SHIPPED | OUTPATIENT
Start: 2024-01-16 | End: 2024-01-21

## 2024-01-16 NOTE — TELEPHONE ENCOUNTER
Action Requested: Summary for Provider     []  Critical Lab, Recommendations Needed  [] Need Additional Advice  []   FYI    []   Need Orders  [] Need Medications Sent to Pharmacy  [x]  Other     SUMMARY:   Verified name and .    Patient was seen during office visit on 24 for urinary symptoms- she states symptoms have worsened and now with more visible blood upon urination- some blood on toilet bowl.    Patient reports hematuria, dysuria, and urinary urgency. She denies any fevers at this time.    Appointment scheduled:  Future Appointments   Date Time Provider Department Center   2024  9:10 AM [facility]   2024  3:00 PM [address]   2024  8:30 AM [facility]   3/5/2024 11:40 AM [address]       Reason for call: Acute  Onset: Data Unavailable                Reason for Disposition   Pain or burning with passing urine (urination)    Protocols used: Urine - Blood In-A-OH

## 2024-01-16 NOTE — PROGRESS NOTES
Jennifer Bah is a 48 year old female who is here for  1. Hematuria, unspecified type    2. Acute UTI    3. Abnormality of globulin    4. Weight loss      HPI:   Jennifer Bah is a 48 year old female  presents for a UTI. Last night her urine was brown, and got again the urgency, frequency and burning in urination. No fever or chills. Recent weight loss about 10 lb. Normal appetite. No nausea or vomiting.     November and December UTI with a telemedicine given antibiotics (bactrim and nitrofurantoin). Improved.  Then las week had on-off urinary symptoms, then had an OV.    Past Medical History:   Diagnosis Date    Anxiety     Asthma      Past Surgical History:   Procedure Laterality Date    ADENOIDECTOMY      CYST ASPIRATION LEFT      benign    LACEY LOCALIZATION WIRE 1 SITE LEFT (CPT=19281)      benign    TONSILLECTOMY         Current Outpatient Medications:     sulfamethoxazole-trimethoprim -160 MG Oral Tab per tablet, Take 1 tablet by mouth 2 (two) times daily for 5 days., Disp: 10 tablet, Rfl: 0    benzonatate 100 MG Oral Cap, Take 1 capsule (100 mg total) by mouth 2 (two) times daily as needed for cough., Disp: 20 capsule, Rfl: 0    Beclomethasone Diprop HFA 40 MCG/ACT Inhalation Aerosol, Breath Activated, Inhale 40 mcg into the lungs daily., Disp: 1 each, Rfl: 1    albuterol 108 (90 Base) MCG/ACT Inhalation Aero Soln, Inhale 1 puff into the lungs every 6 (six) hours as needed for Wheezing., Disp: 1 each, Rfl: 1    WELLBUTRIN  MG Oral Tablet 24 Hr, Take 1 tablet (300 mg total) by mouth daily., Disp: , Rfl:     ALPRAZolam 0.25 MG Oral Tab, Take 1 tablet (0.25 mg total) by mouth nightly as needed., Disp: 30 tablet, Rfl: 0    Allergies:  Allergies   Allergen Reactions    Dust Mite Extract ITCHING     Social History     Socioeconomic History    Marital status:      Spouse name: Not on file    Number of children: Not on file    Years of education: Not on file    Highest education level: Not on  file   Occupational History    Not on file   Tobacco Use    Smoking status: Never     Passive exposure: Never    Smokeless tobacco: Never   Vaping Use    Vaping Use: Never used   Substance and Sexual Activity    Alcohol use: Yes     Alcohol/week: 1.0 standard drink of alcohol     Types: 1 Glasses of wine per week     Comment: 1 x weekly    Drug use: Never    Sexual activity: Yes     Partners: Male   Other Topics Concern    Caffeine Concern Not Asked    Exercise Not Asked    Seat Belt Not Asked    Special Diet Not Asked    Stress Concern Not Asked    Weight Concern Not Asked   Social History Narrative    The patient does not use an assistive device..           Social Determinants of Health     Financial Resource Strain: Not on file   Food Insecurity: Not on file   Transportation Needs: Not on file   Physical Activity: Not on file   Stress: Not on file   Social Connections: Not on file   Housing Stability: Not on file       REVIEW OF SYSTEMS:     GENERAL HEALTH: No fevers, chills, sweats, fatigue  VISION: No recent vision problems, blurry vision or double vision  HEENT: No decreased hearing ear pain nasal congestion or sore throat  SKIN: denies any unusual skin lesions or rashes  RESPIRATORY: denies shortness of breath, cough, wheezing  CARDIOVASCULAR: denies chest pain on exertion, palpitations, swelling in feet  GI: denies abdominal pain and denies heartburn, nausea or vomiting  : +Pain on urination, change in the color of urine, urinating frequently  MUS: No back pain, joint pain, muscle pain  NEURO: denies headaches , anxiety, depression    EXAM:     Vitals:    01/16/24 0859   BP: 126/86   Pulse: 81   SpO2: 98%   Weight: 161 lb 6.4 oz (73.2 kg)   Height: 5' 7\" (1.702 m)     GENERAL: well developed, well nourished,in no apparent distress.  SKIN: no rashes,no suspicious lesions  HEENT: atraumatic, normocephalic,ears and throat are clear,   NECK: supple,no adenopathy,  LUNGS: clear to auscultation, no  wheeze  CARDIO: RRR without murmur  GI: good BS's,no masses, lower abdominal tenderness  EXTREMITIES: no cyanosis, or edema    ASSESSMENT AND PLAN:   1. Hematuria, unspecified type  2. Acute UTI  -recurrent hematuria and UTI  -so far 3x UTI since November treated with abx (first 2 on televisit)  -UA today with large hematuria and proteinuria and LE3+  -discussed in detail red flags and when to go to the ER  Plan:  - Urine Culture, Routine [E]; Future  - sulfamethoxazole-trimethoprim -160 MG Oral Tab per tablet; Take 1 tablet by mouth 2 (two) times daily for 5 days.  Dispense: 10 tablet; Refill: 0  - Already scheduled a renal US and urology follow up    3. Abnormality of globulin  4. Weight loss  -on previous blood work 12/2023 low globulin and elevated bili  -10 lbs unintentional weight loss  Plan  - Monoclonal Protein Study [E]; Future  - Bence Menendez protein, urine, random [E]; Future  - CBC, Platelet; No Differential; Future  - Comp Metabolic Panel (14) [E]; Future      The patient indicates understanding of these issues and agrees to the plan.    Mable Parham MD  1/16/2024

## 2024-01-17 NOTE — TELEPHONE ENCOUNTER
Sana Valverde, RN 1/17/2024 12:07 PM CST        ----- Message -----  From: Jennifer Bah  Sent: 1/16/2024 4:16 PM CST  To: Em Triage Support  Subject: Protein     I forgot to mention this morning but I did work out last night. I do martial arts. It was about an hour and fairly intense at times.

## 2024-01-18 ENCOUNTER — APPOINTMENT (OUTPATIENT)
Dept: OBGYN | Age: 49
End: 2024-01-18

## 2024-01-18 VITALS
TEMPERATURE: 97.6 F | DIASTOLIC BLOOD PRESSURE: 87 MMHG | HEIGHT: 67 IN | OXYGEN SATURATION: 97 % | BODY MASS INDEX: 25.44 KG/M2 | SYSTOLIC BLOOD PRESSURE: 132 MMHG | HEART RATE: 70 BPM | WEIGHT: 162.1 LBS

## 2024-01-18 DIAGNOSIS — Z01.419 GYNECOLOGIC EXAM NORMAL: Primary | ICD-10-CM

## 2024-01-18 DIAGNOSIS — R68.82 LIBIDO, DECREASED: ICD-10-CM

## 2024-01-18 DIAGNOSIS — R31.0 HEMATURIA, GROSS: ICD-10-CM

## 2024-01-18 DIAGNOSIS — N39.0 RECURRENT UTI: ICD-10-CM

## 2024-01-18 DIAGNOSIS — Z12.4 ENCOUNTER FOR SCREENING FOR MALIGNANT NEOPLASM OF CERVIX: ICD-10-CM

## 2024-01-18 DIAGNOSIS — Z12.31 SCREENING MAMMOGRAM FOR BREAST CANCER: ICD-10-CM

## 2024-01-18 DIAGNOSIS — F41.9 ANXIETY: ICD-10-CM

## 2024-01-18 PROCEDURE — 99396 PREV VISIT EST AGE 40-64: CPT | Performed by: OBSTETRICS & GYNECOLOGY

## 2024-01-18 RX ORDER — SULFAMETHOXAZOLE AND TRIMETHOPRIM 800; 160 MG/1; MG/1
1 TABLET ORAL
COMMUNITY
Start: 2024-01-16 | End: 2024-01-21

## 2024-01-18 RX ORDER — ALPRAZOLAM 0.25 MG/1
0.25 TABLET ORAL
COMMUNITY
Start: 2023-10-16

## 2024-01-18 RX ORDER — ALBUTEROL SULFATE 90 UG/1
AEROSOL, METERED RESPIRATORY (INHALATION)
COMMUNITY

## 2024-01-18 RX ORDER — ERGOCALCIFEROL 1.25 MG/1
CAPSULE ORAL
COMMUNITY
Start: 2023-12-04

## 2024-01-18 RX ORDER — BUPROPION HYDROCHLORIDE 150 MG/1
TABLET ORAL
COMMUNITY
Start: 2024-01-18

## 2024-01-18 RX ORDER — BUPROPION HYDROCHLORIDE 300 MG/1
300 TABLET ORAL DAILY
COMMUNITY
Start: 2023-09-21

## 2024-01-18 RX ORDER — SERTRALINE HYDROCHLORIDE 25 MG/1
25 TABLET, FILM COATED ORAL DAILY
COMMUNITY
Start: 2023-11-27

## 2024-01-19 ENCOUNTER — HOSPITAL ENCOUNTER (OUTPATIENT)
Dept: ULTRASOUND IMAGING | Age: 49
Discharge: HOME | End: 2024-01-19
Payer: COMMERCIAL

## 2024-01-19 DIAGNOSIS — N39.0 RECURRENT UTI: ICD-10-CM

## 2024-01-19 DIAGNOSIS — R35.0 FREQUENT URINATION: ICD-10-CM

## 2024-01-19 LAB
ALBUMIN SERPL ELPH-MCNC: 4.23 G/DL (ref 3.75–5.21)
ALBUMIN/GLOB SERPL: 1.71 {RATIO} (ref 1–2)
ALPHA1 GLOB SERPL ELPH-MCNC: 0.31 G/DL (ref 0.19–0.46)
ALPHA2 GLOB SERPL ELPH-MCNC: 0.64 G/DL (ref 0.48–1.05)
B-GLOBULIN SERPL ELPH-MCNC: 0.68 G/DL (ref 0.68–1.23)
GAMMA GLOB SERPL ELPH-MCNC: 0.84 G/DL (ref 0.62–1.7)
KAPPA LC FREE SER-MCNC: 1.52 MG/DL (ref 0.33–1.94)
KAPPA LC FREE/LAMBDA FREE SER NEPH: 1.58 {RATIO} (ref 0.26–1.65)
LAMBDA LC FREE SERPL-MCNC: 0.96 MG/DL (ref 0.57–2.63)
PROT SERPL-MCNC: 6.7 G/DL (ref 5.7–8.2)

## 2024-01-19 PROCEDURE — 76770 US EXAM ABDO BACK WALL COMP: CPT

## 2024-01-22 ENCOUNTER — OFFICE VISIT (OUTPATIENT)
Dept: SURGERY | Facility: CLINIC | Age: 49
End: 2024-01-22
Payer: COMMERCIAL

## 2024-01-22 DIAGNOSIS — N39.0 RECURRENT UTI: Primary | ICD-10-CM

## 2024-01-22 DIAGNOSIS — R80.9 PROTEINURIA, UNSPECIFIED TYPE: Primary | ICD-10-CM

## 2024-01-22 PROCEDURE — 99244 OFF/OP CNSLTJ NEW/EST MOD 40: CPT | Performed by: UROLOGY

## 2024-01-22 RX ORDER — ESTRADIOL 0.1 MG/G
CREAM VAGINAL
Qty: 42.5 G | Refills: 11 | Status: CANCELLED | OUTPATIENT
Start: 2024-01-22

## 2024-01-22 NOTE — PROGRESS NOTES
Litzy Marie MD  Department of Urology  [address] [place] [address] Rd[address]  [place] [address]    T: [phone]  F: [phone]    To: WYATT DE LA FUENTE MD   [address] [place] [id number]    Re: Jennifer Bah   MRN: [id number]  : 3/16/1975    Dear WYATT DE LA FUENTE MD,    Today I had the pleasure of seeing Jennifer Bah in my clinic. As you know, Ms. Bah is a pleasant 48 year old year old female who I am seeing for Anahi. Patient was last seen in this department on Visit date not found.    Briefly, patient was noted to have an isolated UTI 2024.  I do not see any other positive urine cultures. She was told she may be perimenopausal    URINE CULTURE 10,000 - 50,000 CFU/ML Citrobacter koseri Abnormal            Resulting Agency: [facility] ([facility])     Susceptibility     Citrobacter koseri     Not Specified    Cefazolin <=4 Sensitive    Ciprofloxacin <=0.25 Sensitive    Gentamicin <=1 Sensitive    Levofloxacin <=0.12 Sensitive    Meropenem <=0.25 Sensitive    Nitrofurantoin <=16 Sensitive    Piperacillin + Tazobactam <=4 Sensitive    Trimethoprim/Sulfa <=20 Sensitive                    PAST MEDICAL HISTORY:  Past Medical History:   Diagnosis Date    Anxiety     Asthma         PAST SURGICAL HISTORY:  Past Surgical History:   Procedure Laterality Date    ADENOIDECTOMY      CYST ASPIRATION LEFT      benign    LACEY LOCALIZATION WIRE 1 SITE LEFT (CPT=19281)      benign    TONSILLECTOMY           ALLERGIES:  Allergies   Allergen Reactions    Dust Mite Extract ITCHING         MEDICATIONS:  Current Outpatient Medications   Medication Instructions    albuterol 108 (90 Base) MCG/ACT Inhalation Aero Soln 1 puff, Inhalation, Every 6 hours PRN    ALPRAZolam (XANAX) 0.25 mg, Oral, Nightly PRN    Beclomethasone Diprop HFA 40 mcg, Inhalation, Daily    benzonatate (TESSALON) 100 mg, Oral, 2 times daily PRN    WELLBUTRIN  MG Oral Tablet 24 Hr 1 tablet, Oral, Daily        FAMILY HISTORY:  Family History   Problem  Relation Age of Onset    Musculo-skelatal Disorder Father     Lipids Mother     Hypertension Mother     Other (bypass) Mother     Pancreatic Cancer Maternal Grandmother 82        80s        SOCIAL HISTORY:  Social History     Socioeconomic History    Marital status:    Tobacco Use    Smoking status: Never     Passive exposure: Never    Smokeless tobacco: Never   Vaping Use    Vaping Use: Never used   Substance and Sexual Activity    Alcohol use: Yes     Alcohol/week: 1.0 standard drink of alcohol     Types: 1 Glasses of wine per week     Comment: 1 x weekly    Drug use: Never    Sexual activity: Yes     Partners: Male   Social History Narrative    The patient does not use an assistive device..                PHYSICAL EXAMINATION:  There were no vitals filed for this visit.  CONSTITUTIONAL: No apparent distress, cooperative and communicative  NEUROLOGIC: Alert and oriented   HEAD: Normocephalic, atraumatic   EYES: Sclera non-icteric   ENT: Hearing intact, moist mucous membranes   NECK: No obvious goiter or masses   RESPIRATORY: Normal respiratory effort, Nonlabored breathing on room air  SKIN: No evident rashes   ABDOMEN: Soft, nontender, nondistended, no rebound tenderness, no guarding, no masses      REVIEW OF SYSTEMS:    A comprehensive 10-point review of systems was completed.  Pertinent positives and negatives are noted in the the HPI.       LABORATORY DATA:  URINE CULTURE 10,000 - 50,000 CFU/ML Citrobacter koseri Abnormal            Resulting Agency: [facility] ([facility])     Susceptibility     Citrobacter koseri     Not Specified    Cefazolin <=4 Sensitive    Ciprofloxacin <=0.25 Sensitive    Gentamicin <=1 Sensitive    Levofloxacin <=0.12 Sensitive    Meropenem <=0.25 Sensitive    Nitrofurantoin <=16 Sensitive    Piperacillin + Tazobactam <=4 Sensitive    Trimethoprim/Sulfa <=20 Sensitive                     IMAGING REVIEW:  PROCEDURE: US KIDNEY/BLADDER (CPT=76770)      COMPARISON: None.      INDICATIONS:  0 Recurrent UTI R35.0 Frequent urination      TECHNIQUE: Ultrasound examination was performed to visualize the kidneys and bladder.       FINDINGS:   RIGHT KIDNEY: Normal.  Right kidney length is 10.49 cm.  Normal echotexture.  No hydronephrosis, mass, calculus or perirenal fluid.    LEFT KIDNEY: Normal.  Left kidney length is 12.34 cm.  Normal echotexture.  No hydronephrosis, mass, calculus or perirenal fluid.    BLADDER: Normal.  No visible wall thickening, mass, or calculus.  Prevoid bladder volume is 453 mL, and tiny postvoid residual of 9 mL.      CONCLUSION:   1. Normal bilateral kidneys.  No hydronephrosis.  Normal echogenicity.   2. Normal urinary bladder.  Prevoid bladder volume is 453 mL, and there is a tiny postvoid residual of 9 mL.               DICTATED BY (CST): PHOENIX DAY MD ON 1/19/2024 AT 4:51 PM       FINALIZED BY (CST): PHOENIX DAY MD ON 1/19/2024 AT 4:53 PM                       OTHER RELEVANT DATA:   none     IMPRESSION: Reported frequent urinary tract infections-recommend behavioral management as listed below.  Can consider Estrace cream when postmenopausal.  It is reassuring that her renal bladder ultrasound is unremarkable.  If she has persistent infections over the next 6 to 12 months despite behavioral management can consider CT urogram cystoscopy and pelvic examination.    We talked about UTI prevention with continuing good hydration, starting a women's probiotic (bottle should say women's, vaginal, genitourinary; main ingredient should be lactobacillus), Cranberry pills (Ellura, Utiva, Crancap -all are found on [employer] on their respective website; they should have 36 mg PAC), bowel regimen (colace, senna, miralax), voiding before and after sexual activity and using pH balanced soaps. Can continue to check urine and treat when UCx is positive. If this persists,  can consider initiating low dose antibiotic prophylaxis for 6 months versus gentamicin irrigations if she would like a  more local therapy.     Continuous antimicrobial prophylaxis regimens for women with recurrent UTIs have been recommended by several trials.  The dosing options for continuous prophylaxis includes the following:    TMP 100mg once daily  TMO-SMX 40mg/200mg once daily  TMP-SMX 40mg 200mg thrice weekly  Nitrofurantoin monohydrate/macrocrystals 50mg daily  Nitrofurantoin onohydrate/macrocrystals 100mg daily  Cephalexin 125mg once daily  Cephalexin 250mg once daily  Fosfomycin 3g every 10 days     These regimens can continue for 3 to 6 months.     Recommended instructions for antibiotic prophylaxis related to sexual intercourse include taking a single dose of antibiotic immediately before or after sexual intercourse.  Dosing options for prophylaxis includes the following:     TMP-SMX 40mg/200mg  TMP-SMX 80mg/400mg  Nitrofurantoin monohydrate/macrocrystals 50mg - 100mg  Cephalexin 250mg       PLAN:  Behavioral management  Consider estrace cream when postmenopausal    Thank you for referring this very pleasant patient to my clinic. If you have any questions or concerns, please do not hesitate to contact me.    Sincerely,  Litzy Marie MD    30 minutes were spent on this patient at this visit obtaining a history, reviewing medical records, developing a treatment plan, counseling and discussing treatment strategy with patient, coordination of care and documentation.     The 21st Century Cures Act makes medical notes available to patients in the interest of transparency.  However, please be advised that this is a medical document.  It is intended as a peer to peer communication.  It is written in medical language and may contain abbreviations or verbiage that are technical and unfamiliar.  It may appear blunt or direct.  Medical documents are intended to carry relevant information, facts as evident, and the clinical opinion of the practitioner.

## 2024-02-21 ENCOUNTER — OFFICE VISIT (OUTPATIENT)
Dept: PHYSICAL MEDICINE AND REHAB | Facility: CLINIC | Age: 49
End: 2024-02-21
Payer: COMMERCIAL

## 2024-02-21 VITALS — WEIGHT: 156 LBS | HEIGHT: 67 IN | BODY MASS INDEX: 24.48 KG/M2

## 2024-02-21 DIAGNOSIS — M54.12 CERVICAL RADICULOPATHY: ICD-10-CM

## 2024-02-21 DIAGNOSIS — S46.011A STRAIN OF RIGHT ROTATOR CUFF CAPSULE, INITIAL ENCOUNTER: Primary | ICD-10-CM

## 2024-02-21 PROCEDURE — 99214 OFFICE O/P EST MOD 30 MIN: CPT | Performed by: PHYSICAL MEDICINE & REHABILITATION

## 2024-02-21 PROCEDURE — 3008F BODY MASS INDEX DOCD: CPT | Performed by: PHYSICAL MEDICINE & REHABILITATION

## 2024-02-21 RX ORDER — MELOXICAM 15 MG/1
15 TABLET ORAL DAILY
Qty: 30 TABLET | Refills: 0 | Status: SHIPPED | OUTPATIENT
Start: 2024-02-21

## 2024-02-21 RX ORDER — CYCLOBENZAPRINE HCL 10 MG
10 TABLET ORAL NIGHTLY
Qty: 30 TABLET | Refills: 0 | Status: SHIPPED | OUTPATIENT
Start: 2024-02-21 | End: 2024-03-22

## 2024-02-21 NOTE — PROGRESS NOTES
[facility]  Progress Note    CHIEF COMPLAINT:    Chief Complaint   Patient presents with    Follow - Up     LOV 9/15/2021 Patient c/o constant numbness in her left arm and left thumb, states this has been an issue on and off for 3-4 years but has come back as of 3 weeks ago. Denies any current treatment for this. Also c/o pain in her right shoulder, having trouble lifting above her head. LOP 1/10       History of Present Illness:  Jennifer Bah is a 48 year old female who presents today for follow up for symptoms of chronic left cervical radiculopathy and new right shoulder pain.  I treated her previously for cervical radiculopathy with physical therapy and oral medication.  Symptoms have been reasonably stable since her last visit with me in 2021.  Over the past several months she has noticed insidious worsening of right shoulder pain with limited range of motion.  She does martial arts and notices it when she does overhead activity with nunchucks.    PAST MEDICAL HISTORY:  Past Medical History:   Diagnosis Date    Anxiety     Asthma (HCC)        SURGICAL HISTORY:  Past Surgical History:   Procedure Laterality Date    ADENOIDECTOMY      CYST ASPIRATION LEFT      benign    LACEY LOCALIZATION WIRE 1 SITE LEFT (CPT=19281)      benign    TONSILLECTOMY         SOCIAL HISTORY:   Social History     Occupational History    Not on file   Tobacco Use    Smoking status: Never     Passive exposure: Never    Smokeless tobacco: Never   Vaping Use    Vaping Use: Never used   Substance and Sexual Activity    Alcohol use: Not Currently     Alcohol/week: 1.0 standard drink of alcohol     Comment: Working on weight loss, cut out alcohol    Drug use: Never    Sexual activity: Yes     Partners: Male       CURRENT MEDICATIONS:   Current Outpatient Medications   Medication Sig Dispense Refill    Meloxicam 15 MG Oral Tab Take 1 tablet (15 mg total) by mouth daily. 30 tablet 0     cyclobenzaprine 10 MG Oral Tab Take 1 tablet (10 mg total) by mouth nightly. 30 tablet 0    benzonatate 100 MG Oral Cap Take 1 capsule (100 mg total) by mouth 2 (two) times daily as needed for cough. 20 capsule 0    Beclomethasone Diprop HFA 40 MCG/ACT Inhalation Aerosol, Breath Activated Inhale 40 mcg into the lungs daily. 1 each 1    albuterol 108 (90 Base) MCG/ACT Inhalation Aero Soln Inhale 1 puff into the lungs every 6 (six) hours as needed for Wheezing. 1 each 1    WELLBUTRIN  MG Oral Tablet 24 Hr Take 1 tablet (300 mg total) by mouth daily.      ALPRAZolam 0.25 MG Oral Tab Take 1 tablet (0.25 mg total) by mouth nightly as needed. 30 tablet 0       ALLERGIES:   Allergies   Allergen Reactions    Dust Mite Extract ITCHING         PHYSICAL EXAM:   Ht 67\"   Wt 156 lb (70.8 kg)   BMI 24.43 kg/m²     Body mass index is 24.43 kg/m².      General: No immediate distress  Extremities: No upper extremity edema bilaterally   Spine: full and painfree cervical ROM in all directions  Shoulders: Limited right shoulder range of motion and internal rotation, positive Recinos impingement maneuver, Neer's impingement maneuvers negative  Neuro:   Cognition: alert & oriented x 3, attentive, comprehention intact, spontaneous speech intact  Strength:  Upper extremities have 5/5 strength  Sensation: Normal upper extremities  Reflexes: Normal upper extremities  Spurling's sign: neg bilaterally        Data    Radiology Imagin.  I reviewed a cervical MRI from May 2021 showing a left C5-6 disc osteophyte causing foraminal and mild central canal stenosis.  There is some hypertrophy of the PLL in front of the C6 vertebral body.  No cord signal abnormality, but there is relatively poor image quality.  There is some deflection of the cord at C5-6 as well.      ASSESSMENT AND PLAN:  1. Strain of right rotator cuff capsule, initial encounter  The patient has clear evidence of rotator cuff strain.  I recommend she hold off from  martial arts for a little while.  I started her on Mobic and Flexeril along with physical therapy.  - PHYSICAL THERAPY EXTERNAL    2. Cervical radiculopathy  Her left C6 radiculopathy is very stable.  Patient was reassured.        RTC 4 weeks        The patient was in agreement with the assessment and plan.  All questions were answered.        Humberto Acosta MD  Physical Medicine and Rehabilitation/Sports Medicine  [facility]

## 2024-02-22 ENCOUNTER — MED REC SCAN ONLY (OUTPATIENT)
Dept: PHYSICAL MEDICINE AND REHAB | Facility: CLINIC | Age: 49
End: 2024-02-22

## 2024-03-05 ENCOUNTER — HOSPITAL ENCOUNTER (OUTPATIENT)
Dept: MAMMOGRAPHY | Facility: HOSPITAL | Age: 49
Discharge: HOME | End: 2024-03-05
Attending: INTERNAL MEDICINE
Payer: COMMERCIAL

## 2024-03-05 DIAGNOSIS — Z12.31 SCREENING MAMMOGRAM FOR BREAST CANCER: ICD-10-CM

## 2024-03-05 DIAGNOSIS — Z12.31 ENCOUNTER FOR SCREENING MAMMOGRAM FOR MALIGNANT NEOPLASM OF BREAST: ICD-10-CM

## 2024-03-05 PROCEDURE — 77063 BREAST TOMOSYNTHESIS BI: CPT | Performed by: INTERNAL MEDICINE

## 2024-03-05 PROCEDURE — 77067 SCR MAMMO BI INCL CAD: CPT | Performed by: INTERNAL MEDICINE

## 2024-03-06 ENCOUNTER — MED REC SCAN ONLY (OUTPATIENT)
Dept: PHYSICAL MEDICINE AND REHAB | Facility: CLINIC | Age: 49
End: 2024-03-06

## 2024-03-13 ENCOUNTER — HOSPITAL ENCOUNTER (OUTPATIENT)
Dept: ULTRASOUND IMAGING | Facility: HOSPITAL | Age: 49
Discharge: HOME | End: 2024-03-13
Attending: INTERNAL MEDICINE
Payer: COMMERCIAL

## 2024-03-13 ENCOUNTER — HOSPITAL ENCOUNTER (OUTPATIENT)
Dept: MAMMOGRAPHY | Facility: HOSPITAL | Age: 49
Discharge: HOME | End: 2024-03-13
Attending: INTERNAL MEDICINE
Payer: COMMERCIAL

## 2024-03-13 DIAGNOSIS — R92.8 ABNORMAL MAMMOGRAM: ICD-10-CM

## 2024-03-13 PROCEDURE — 77061 BREAST TOMOSYNTHESIS UNI: CPT | Performed by: INTERNAL MEDICINE

## 2024-03-13 PROCEDURE — 76642 ULTRASOUND BREAST LIMITED: CPT | Performed by: INTERNAL MEDICINE

## 2024-03-13 PROCEDURE — 77065 DX MAMMO INCL CAD UNI: CPT | Performed by: INTERNAL MEDICINE

## 2024-03-19 RX ORDER — MELOXICAM 15 MG/1
15 TABLET ORAL DAILY
Qty: 30 TABLET | Refills: 0 | Status: SHIPPED | OUTPATIENT
Start: 2024-03-19

## 2024-03-19 NOTE — TELEPHONE ENCOUNTER
Refill Request    Medication request: Meloxicam 15 MG Oral Tab.  Take 1 tablet (15 mg total) by mouth daily.      LOV:2/21/2024 Humberto Acosta MD   Due back to clinic per last office note:  RTC 4 weeks   NOV: 4/9/2024 Humberto Acosta MD      [place]: 2/21/2024 #30    Urine drug screen (if applicable): N/A  Pain contract: N/A    LOV plan (if weaning or changing medications): I started her on Mobic and Flexeril along with physical therapy.

## 2024-04-08 ENCOUNTER — MED REC SCAN ONLY (OUTPATIENT)
Dept: PHYSICAL MEDICINE AND REHAB | Facility: CLINIC | Age: 49
End: 2024-04-08

## 2024-04-09 ENCOUNTER — TELEPHONE (OUTPATIENT)
Dept: PHYSICAL MEDICINE AND REHAB | Facility: CLINIC | Age: 49
End: 2024-04-09

## 2024-04-09 ENCOUNTER — OFFICE VISIT (OUTPATIENT)
Dept: PHYSICAL MEDICINE AND REHAB | Facility: CLINIC | Age: 49
End: 2024-04-09
Payer: COMMERCIAL

## 2024-04-09 VITALS — WEIGHT: 152 LBS | OXYGEN SATURATION: 98 % | HEART RATE: 78 BPM | BODY MASS INDEX: 24 KG/M2

## 2024-04-09 DIAGNOSIS — M22.2X1 PATELLOFEMORAL PAIN SYNDROME OF RIGHT KNEE: ICD-10-CM

## 2024-04-09 DIAGNOSIS — M25.811 IMPINGEMENT OF RIGHT SHOULDER: Primary | ICD-10-CM

## 2024-04-09 PROCEDURE — 20610 DRAIN/INJ JOINT/BURSA W/O US: CPT | Performed by: PHYSICAL MEDICINE & REHABILITATION

## 2024-04-09 PROCEDURE — 99214 OFFICE O/P EST MOD 30 MIN: CPT | Performed by: PHYSICAL MEDICINE & REHABILITATION

## 2024-04-09 RX ORDER — TRIAMCINOLONE ACETONIDE 40 MG/ML
80 INJECTION, SUSPENSION INTRA-ARTICULAR; INTRAMUSCULAR ONCE
Status: COMPLETED | OUTPATIENT
Start: 2024-04-09 | End: 2024-04-09

## 2024-04-09 RX ORDER — LIDOCAINE HYDROCHLORIDE 10 MG/ML
4 INJECTION, SOLUTION INFILTRATION; PERINEURAL ONCE
Status: COMPLETED | OUTPATIENT
Start: 2024-04-09 | End: 2024-04-09

## 2024-04-09 NOTE — PROGRESS NOTES
[facility]  Progress Note    CHIEF COMPLAINT:    Chief Complaint   Patient presents with    PT Follow-Up     02/21/24 LOV. She had her last PT session yesterday. States 10% improvement. States her ROM is a bit better but pain is the same. Intermittent t/n in RUE but less frequent. Pain 3/10. Mobic daily with some relief.        History of Present Illness:  Jennifer Bah is a 49 year old female who presents today for follow up for symptoms of right shoulder pain.  She has been doing physical therapy for that and is slightly better but not really good enough.  She has a hard time sleeping on the arm and cannot reach overhead.  Additionally she injured her right knee recently and thinks it might be swollen.  She has had knee problems since she was a teenager.    PAST MEDICAL HISTORY:  Past Medical History:   Diagnosis Date    Anxiety     Asthma (HCC)        SURGICAL HISTORY:  Past Surgical History:   Procedure Laterality Date    ADENOIDECTOMY      CYST ASPIRATION LEFT      benign    LACEY LOCALIZATION WIRE 1 SITE LEFT (CPT=19281)      benign    TONSILLECTOMY         SOCIAL HISTORY:   Social History     Occupational History    Not on file   Tobacco Use    Smoking status: Never     Passive exposure: Never    Smokeless tobacco: Never   Vaping Use    Vaping Use: Never used   Substance and Sexual Activity    Alcohol use: Not Currently     Alcohol/week: 1.0 standard drink of alcohol     Comment: Working on weight loss, cut out alcohol    Drug use: Never    Sexual activity: Yes     Partners: Male       CURRENT MEDICATIONS:   Current Outpatient Medications   Medication Sig Dispense Refill    MELOXICAM 15 MG Oral Tab TAKE 1 TABLET (15 MG TOTAL) BY MOUTH DAILY. 30 tablet 0    Beclomethasone Diprop HFA 40 MCG/ACT Inhalation Aerosol, Breath Activated Inhale 40 mcg into the lungs daily. 1 each 1    albuterol 108 (90 Base) MCG/ACT Inhalation Aero Soln Inhale 1 puff into the lungs  every 6 (six) hours as needed for Wheezing. 1 each 1    WELLBUTRIN  MG Oral Tablet 24 Hr Take 1 tablet (300 mg total) by mouth daily.      ALPRAZolam 0.25 MG Oral Tab Take 1 tablet (0.25 mg total) by mouth nightly as needed. 30 tablet 0       ALLERGIES:   Allergies   Allergen Reactions    Dust Mite Extract ITCHING         PHYSICAL EXAM:   Pulse 78   Wt 152 lb (68.9 kg)   SpO2 98%   BMI 23.81 kg/m²     Body mass index is 23.81 kg/m².      General: No immediate distress  Extremities: No upper extremity edema bilaterally   Spine: full and painfree cervical ROM in all directions  Shoulders: Positive right shoulder impingement signs  Knees: Right knee with positive patellar grind, no effusion, full range of motion  Neuro:   Cognition: alert & oriented x 3, attentive, comprehention intact, spontaneous speech intact  Strength:  Upper extremities have 5/5 strength  Sensation: Normal upper extremities  Reflexes: Normal upper extremities  Spurling's sign: neg        ASSESSMENT AND PLAN:  1. Impingement of right shoulder  I injected her right shoulder today.  She will continue with home exercises.  Return in 4 weeks  - SPECIALTY (OTHER) - EXTERNAL  - lidocaine (Xylocaine) 1 % injection  - triamcinolone acetonide (Kenalog-40) 40 MG/ML injection 80 mg    2. Patellofemoral pain syndrome of right knee  She was relieved to know there was no more serious injury.          The patient was in agreement with the assessment and plan.  All questions were answered.        Humberto Acosta MD  Physical Medicine and Rehabilitation/Sports Medicine  [facility]

## 2024-04-09 NOTE — TELEPHONE ENCOUNTER
Initiated authorization for Right shoulder injection CPT/HCPCS 49594,  with Lisha bethea [facility]  Case #[id number]  Status: Approved-authorization is not required per health plan however may be subject to review once claim is submitted    Inj done in office

## 2024-04-09 NOTE — PROGRESS NOTES
[facility]  Progress Note    CHIEF COMPLAINT:    Chief Complaint   Patient presents with    PT Follow-Up     02/21/24 LOV. She had her last PT session yesterday. States 10% improvement. States her ROM is a bit better but pain is the same. Intermittent t/n in RUE but less frequent. Pain 3/10. Mobic daily with some relief.        History of Present Illness:  Jennifer Bah is a 49 year old female who presents today for follow up for symptoms of ***. ***    PAST MEDICAL HISTORY:  Past Medical History:   Diagnosis Date    Anxiety     Asthma (HCC)        SURGICAL HISTORY:  Past Surgical History:   Procedure Laterality Date    ADENOIDECTOMY      CYST ASPIRATION LEFT      benign    LACEY LOCALIZATION WIRE 1 SITE LEFT (CPT=19281)      benign    TONSILLECTOMY         SOCIAL HISTORY:   Social History     Occupational History    Not on file   Tobacco Use    Smoking status: Never     Passive exposure: Never    Smokeless tobacco: Never   Vaping Use    Vaping Use: Never used   Substance and Sexual Activity    Alcohol use: Not Currently     Alcohol/week: 1.0 standard drink of alcohol     Comment: Working on weight loss, cut out alcohol    Drug use: Never    Sexual activity: Yes     Partners: Male       CURRENT MEDICATIONS:   Current Outpatient Medications   Medication Sig Dispense Refill    MELOXICAM 15 MG Oral Tab TAKE 1 TABLET (15 MG TOTAL) BY MOUTH DAILY. 30 tablet 0    Beclomethasone Diprop HFA 40 MCG/ACT Inhalation Aerosol, Breath Activated Inhale 40 mcg into the lungs daily. 1 each 1    albuterol 108 (90 Base) MCG/ACT Inhalation Aero Soln Inhale 1 puff into the lungs every 6 (six) hours as needed for Wheezing. 1 each 1    WELLBUTRIN  MG Oral Tablet 24 Hr Take 1 tablet (300 mg total) by mouth daily.      ALPRAZolam 0.25 MG Oral Tab Take 1 tablet (0.25 mg total) by mouth nightly as needed. 30 tablet 0       ALLERGIES:   Allergies   Allergen Reactions    Dust Mite Extract  ITCHING         PHYSICAL EXAM:   Pulse 78   Wt 152 lb (68.9 kg)   SpO2 98%   BMI 23.81 kg/m²     Body mass index is 23.81 kg/m².      General: No immediate distress  Extremities: No upper extremity edema bilaterally   Spine: full and painfree cervical ROM in all directions  Shoulders: full and painfree ROM   Neuro:   Cognition: alert & oriented x 3, attentive, comprehention intact, spontaneous speech intact  Strength:  Upper extremities have 5/5 strength  Sensation: Normal upper extremities  Reflexes: Normal upper extremities  Spurling's sign: neg        Data    Radiology Imaging:  ***    ASSESSMENT AND PLAN:  There are no diagnoses linked to this encounter.      RTC ***        The patient was in agreement with the assessment and plan.  All questions were answered.        Humberto Acosta MD  Physical Medicine and Rehabilitation/Sports Medicine  [facility]

## 2024-04-18 RX ORDER — MELOXICAM 15 MG/1
15 TABLET ORAL DAILY
Qty: 30 TABLET | Refills: 0 | Status: SHIPPED | OUTPATIENT
Start: 2024-04-18

## 2024-04-18 NOTE — TELEPHONE ENCOUNTER
Refill Request    Medication request: MELOXICAM 15 MG Oral Tab.  TAKE 1 TABLET (15 MG TOTAL) BY MOUTH DAILY.      LOV:4/9/2024 Humberto Acosta MD   Due back to clinic per last office note:  Return in 4 weeks   NOV: 5/7/2024 Humberto Acosta MD      [place]: 3/19/2024 #30 (30 days)    Urine drug screen (if applicable): N/A  Pain contract: N/A    LOV plan (if weaning or changing medications): not mentioned         Per Dr Acosta's protocol. Refill 3/3.

## 2024-05-07 ENCOUNTER — TELEPHONE (OUTPATIENT)
Dept: PHYSICAL MEDICINE AND REHAB | Facility: CLINIC | Age: 49
End: 2024-05-07

## 2024-05-07 ENCOUNTER — MED REC SCAN ONLY (OUTPATIENT)
Dept: PHYSICAL MEDICINE AND REHAB | Facility: CLINIC | Age: 49
End: 2024-05-07

## 2024-05-07 ENCOUNTER — OFFICE VISIT (OUTPATIENT)
Dept: PHYSICAL MEDICINE AND REHAB | Facility: CLINIC | Age: 49
End: 2024-05-07
Payer: COMMERCIAL

## 2024-05-07 VITALS — SYSTOLIC BLOOD PRESSURE: 100 MMHG | DIASTOLIC BLOOD PRESSURE: 62 MMHG

## 2024-05-07 DIAGNOSIS — M70.41 PREPATELLAR BURSITIS OF RIGHT KNEE: Primary | ICD-10-CM

## 2024-05-07 DIAGNOSIS — M70.51 SUPRAPATELLAR BURSITIS OF RIGHT KNEE: ICD-10-CM

## 2024-05-07 NOTE — TELEPHONE ENCOUNTER
Initiated authorization for Right knee bursa aspiration CPT 75783 with Laura Carson  Case #[id number]  Status: Approved-authorization is not required per health plan based on medical necessity however is not a guarantee of payment and may be subject to review once claim is submitted    Procedure done in office

## 2024-05-07 NOTE — PROGRESS NOTES
[facility]  Progress Note    CHIEF COMPLAINT:    Chief Complaint   Patient presents with    Follow - Up     LOV: 4/9/2024 Patient received a right shoulder injection on her LOV with 95% effectiveness . She4 would like to disc uss right knee today. She can not kneel on it and is having trouble walking down the stairs. Pain is a 3/10. Meloxicam for pain. She states it is more swollen than her lov.        History of Present Illness:  Jennifer Bah is a 49 year old female who presents today for follow up for symptoms of right knee swelling.  Her right knee has been bothering her for months.  There is a fluctuating area of fluid/swelling.  She has a hard time kneeling on it and bending it fully.  At her last visit I gave her a shoulder injection and she is 95% better.        PAST MEDICAL HISTORY:  Past Medical History:    Anxiety    Asthma (HCC)       SURGICAL HISTORY:  Past Surgical History:   Procedure Laterality Date    Adenoidectomy      Cyst aspiration left      benign    Madhu localization wire 1 site left (cpt=19281)      benign    Tonsillectomy         SOCIAL HISTORY:   Social History     Occupational History    Not on file   Tobacco Use    Smoking status: Never     Passive exposure: Never    Smokeless tobacco: Never   Vaping Use    Vaping status: Never Used   Substance and Sexual Activity    Alcohol use: Not Currently     Alcohol/week: 1.0 standard drink of alcohol     Comment: Working on weight loss, cut out alcohol    Drug use: Never    Sexual activity: Yes     Partners: Male       CURRENT MEDICATIONS:   Current Outpatient Medications   Medication Sig Dispense Refill    MELOXICAM 15 MG Oral Tab TAKE 1 TABLET (15 MG TOTAL) BY MOUTH DAILY. 30 tablet 0    Beclomethasone Diprop HFA 40 MCG/ACT Inhalation Aerosol, Breath Activated Inhale 40 mcg into the lungs daily. 1 each 1    albuterol 108 (90 Base) MCG/ACT Inhalation Aero Soln Inhale 1 puff into the lungs every 6  (six) hours as needed for Wheezing. 1 each 1    WELLBUTRIN  MG Oral Tablet 24 Hr Take 1 tablet (300 mg total) by mouth daily.      ALPRAZolam 0.25 MG Oral Tab Take 1 tablet (0.25 mg total) by mouth nightly as needed. 30 tablet 0       ALLERGIES:   Allergies   Allergen Reactions    Dust Mite Extract ITCHING         PHYSICAL EXAM:   /62     There is no height or weight on file to calculate BMI.      General: No immediate distress  Extremities: No lower extremity edema bilaterally   Knees: Full range of motion but there is a relatively large superficial fluctuance over the anterolateral portion of the patella.  No ballottement.  Pressure from the lateral aspect fills the anterior aspect overlying the patella  Neuro:   Cognition: alert & oriented x 3, attentive, comprehention intact, spontaneous speech intact  Strength: Lower extremities have 5/5 strength          Data    Radiology Imaging:  None for this condition    ASSESSMENT AND PLAN:  1.  Prepatellar bursitis of right knee  On exam she has a relatively large prepatellar bursitis.  Will avoid steroid injection due to risk of infection, but we did aspirate a chronic bloody effusion.  She will try topical Voltaren and Ace wrap it for up to 2 weeks to prevent reaccumulation.  She will also avoid kneeling on it if possible.  - SPECIALTY (OTHER) - EXTERNAL            The patient was in agreement with the assessment and plan.  All questions were answered.        Humberto Acosta MD  Physical Medicine and Rehabilitation/Sports Medicine  [facility]

## 2024-05-07 NOTE — PROCEDURES
Knee aspiration-prepatellar bursa  Location: right  After discussing benefits and possible side effects, we proceeded with a prepatellar bursa aspiration. The patient was consented.  The patient was placed in supine, and the lateral knee was palpated. The skin was sterilely prepped.  Via a lateral approach a 22-gauge needle was introduced into the knee bursa. 8 ml of purpleish colored fluid were aspirated.  Injection site was dressed with topical antibiotic and a folded 4 x 4 pressure dressing.     The patient tolerated the procedure well without adverse effects.

## 2024-05-17 RX ORDER — MELOXICAM 15 MG/1
15 TABLET ORAL DAILY
Qty: 30 TABLET | Refills: 0 | OUTPATIENT
Start: 2024-05-17

## 2024-05-17 NOTE — TELEPHONE ENCOUNTER
Refill Request    Medication request: MELOXICAM 15 MG Oral Tab.  TAKE 1 TABLET (15 MG TOTAL) BY MOUTH DAILY.     LOV:5/7/2024 Humberto Acosta MD   Due back to clinic per last office note:  not mentioned  NOV: Visit date not found      [place]: 4/18/24 #30 (30 days)    Urine drug screen (if applicable): N/A  Pain contract: N/A    LOV plan (if weaning or changing medications): not mentioned      Refill#4. Wow! Stuff message sent it patient still taking since this request was generated by pharmacy.    Patient responded with MCM that she is not taking this medication. Refill denied.

## 2024-05-21 ENCOUNTER — PATIENT MESSAGE (OUTPATIENT)
Dept: PHYSICAL MEDICINE AND REHAB | Facility: CLINIC | Age: 49
End: 2024-05-21

## 2024-05-21 NOTE — TELEPHONE ENCOUNTER
From: Jennifer Bah  To: DALILA RAPHAEL  Sent: 5/21/2024 7:55 AM CDT  Subject: Knee still swollen    Hi, my knee is slightly less swollen but is still clearly bigger than the other one. It's also totally firm, no more jiggle. It feels like there is a lot of pressure and walking is now uncomfortable at times. Should I come back in? Wait it out?

## 2024-06-07 ENCOUNTER — OFFICE VISIT (OUTPATIENT)
Dept: PHYSICAL MEDICINE AND REHAB | Facility: CLINIC | Age: 49
End: 2024-06-07
Payer: COMMERCIAL

## 2024-06-07 VITALS — HEART RATE: 78 BPM | OXYGEN SATURATION: 99 % | BODY MASS INDEX: 23 KG/M2 | WEIGHT: 150 LBS

## 2024-06-07 DIAGNOSIS — M22.2X1 PATELLOFEMORAL PAIN SYNDROME OF RIGHT KNEE: Primary | ICD-10-CM

## 2024-06-07 DIAGNOSIS — M25.811 IMPINGEMENT OF RIGHT SHOULDER: ICD-10-CM

## 2024-06-07 DIAGNOSIS — M70.41 PREPATELLAR BURSITIS OF RIGHT KNEE: ICD-10-CM

## 2024-06-07 PROCEDURE — 99214 OFFICE O/P EST MOD 30 MIN: CPT | Performed by: PHYSICAL MEDICINE & REHABILITATION

## 2024-06-07 NOTE — PROGRESS NOTES
[facility]  Progress Note    CHIEF COMPLAINT:    Chief Complaint   Patient presents with    Follow - Up     05/07/24 R knee aspiration and LOV. She used voltaren gel and wrapped R knee in ace bandage and feels some re-accumulation. Denies t/n. No other pain meds. Pain 2/10.        History of Present Illness:  Jennifer Bah is a 49 year old female who presents today for follow up for symptoms of right knee pain.  Last time I aspirated a bloody effusion from a prepatellar bursa.  The fluid has not reaccumulated.  The knee still bothers her but this is chronic.  The right shoulder is also bothering her again.  Her shoulder injection was in April.  She has meloxicam at home, approximately 1 month supply from a previous refill.  For chronic radicular pain, we have tried doxepin, and she probably has C6 radiculopathy.  Numbness under control right now.        PAST MEDICAL HISTORY:  Past Medical History:    Anxiety    Asthma (HCC)       SURGICAL HISTORY:  Past Surgical History:   Procedure Laterality Date    Adenoidectomy      Cyst aspiration left      benign    Madhu localization wire 1 site left (cpt=19281)      benign    Tonsillectomy         SOCIAL HISTORY:   Social History     Occupational History    Not on file   Tobacco Use    Smoking status: Never     Passive exposure: Never    Smokeless tobacco: Never   Vaping Use    Vaping status: Never Used   Substance and Sexual Activity    Alcohol use: Not Currently     Alcohol/week: 1.0 standard drink of alcohol     Comment: Working on weight loss, cut out alcohol    Drug use: Never    Sexual activity: Yes     Partners: Male       CURRENT MEDICATIONS:   Current Outpatient Medications   Medication Sig Dispense Refill    Beclomethasone Diprop HFA 40 MCG/ACT Inhalation Aerosol, Breath Activated Inhale 40 mcg into the lungs daily. 1 each 1    albuterol 108 (90 Base) MCG/ACT Inhalation Aero Soln Inhale 1 puff into the lungs every 6  (six) hours as needed for Wheezing. 1 each 1    WELLBUTRIN  MG Oral Tablet 24 Hr Take 1 tablet (300 mg total) by mouth daily.      ALPRAZolam 0.25 MG Oral Tab Take 1 tablet (0.25 mg total) by mouth nightly as needed. 30 tablet 0    MELOXICAM 15 MG Oral Tab TAKE 1 TABLET (15 MG TOTAL) BY MOUTH DAILY. (Patient not taking: Reported on 2024) 30 tablet 0       ALLERGIES:   Allergies   Allergen Reactions    Dust Mite Extract ITCHING           PHYSICAL EXAM:   Pulse 78   Wt 150 lb (68 kg)   SpO2 99%   BMI 23.49 kg/m²     Body mass index is 23.49 kg/m².      General: No immediate distress  Extremities: No lower extremity edema bilaterally   Spine: full and painfree lumbar ROM in all directions  Knees: positive patellar grind, no prepatellar effusion.  No suprapatellar effusion.  Neuro:   Cognition: alert & oriented x 3, attentive, able to follow 2 step commands, comprehention intact, spontaneous speech intact  Strength: Lower extremities have 5/5 strength  Sensation: Normal lower extremities  Reflexes: Normal lower extremities  SLR: neg        Data    Radiology Imagin.  I reviewed a cervical MRI from May 2021 showing a left C5-6 disc osteophyte causing foraminal and mild central canal stenosis.  There is some hypertrophy of the PLL in front of the C6 vertebral body.  No cord signal abnormality, but there is relatively poor image quality.  There is some deflection of the cord at C5-6 as well.  2.  A thoracic MRI report from May 2021 was unremarkable except mild dextroscoliosis  3.  A lumbar MRI report from May 2021 showed multiple disc protrusions and atypical hemangioma at L3 with left foraminal stenosis at L2-3 and L3-4.       .  ASSESSMENT AND PLAN:  1. Patellofemoral pain syndrome of right knee  We are back to the chronic knee and more recent shoulder pain.  The knee is more important in the shoulder today.  Lets start PT for patellofemoral syndrome and use meloxicam for a month. May also do imaging of  the knee, shoulder or neck pending exam next time. Since symptoms seem to recur and are widespread, might need to consider Cymbalta.   - PHYSICAL THERAPY EXTERNAL    2. Prepatellar bursitis of right knee  Has not reaccumulated.  99% healed.    3. Impingement of right shoulder  She will do home exercises for the shoulder until next time.        RTC 4 weeks      The patient was in agreement with the assessment and plan.  All questions were answered.        Humberto Acosta MD  Physical Medicine and Rehabilitation/Sports Medicine  [facility]

## 2024-07-02 ENCOUNTER — MED REC SCAN ONLY (OUTPATIENT)
Dept: PHYSICAL MEDICINE AND REHAB | Facility: CLINIC | Age: 49
End: 2024-07-02

## 2024-07-15 ENCOUNTER — PATIENT MESSAGE (OUTPATIENT)
Dept: PHYSICAL MEDICINE AND REHAB | Facility: CLINIC | Age: 49
End: 2024-07-15

## 2024-07-15 DIAGNOSIS — M25.811 IMPINGEMENT OF RIGHT SHOULDER: Primary | ICD-10-CM

## 2024-07-15 NOTE — TELEPHONE ENCOUNTER
From: Jennifer Bah  To: DALILA RAPHAEL  Sent: 7/15/2024 12:23 PM CDT  Subject: MRI for shoulder?    Can we consider an MRI for my rt shoulder? If I can get an order I will postpone my next appt until I have it.   My knee is improving but the shoulder is getting worse.

## 2024-07-16 NOTE — TELEPHONE ENCOUNTER
Of course, MRI of the shoulder ordered, I will get back to her once I get the results, no need for an appointment right now.

## (undated) NOTE — LETTER
[address]   Date:   6/29/2021     Name:   Donnie Villa    YOB: 1975   MRN:   [id number]       WHERE IS YOUR PAIN NOW? Anand the areas on your body where you feel the described sensations.   Use the appropriate sy

## (undated) NOTE — LETTER
21          Jessica Bah  :        To Whom It May Concern: This patient was seen in our office on 21 .       Work Duty Status:     Work Status: Modified Work with Restrictions: (Note: If these restrictions are unavailable, plea

## (undated) NOTE — Clinical Note
Dear Reece De La Torre,    Thank you for sending Anu Saúl to see me for physiatry consultation. I appreciate your confidence in me to care for your patients. Please feel free call me with any questions at [phone] or contact me through [facility]     Sincerely,

## (undated) NOTE — LETTER
Date: May 7, 2024      Patient Name: Jennifer Bah      : 3/16/1975        Thank you for choosing [facility] as your health care provider. Your physician has deemed the following medical service(s) necessary. However, your insurance plan may not pay for all of your health care and costs and may deny payment for this service. The fact that your insurance plan does not pay for an item or service does not mean you should not receive it. The purpose of this form is to help you make an informed decision about whether or not you want to receive this service(s) that may not be paid for by your insurance plan.    CPT Code Description     Cost     _________ _Right knee bursa aspiration _  _____________      _________ ______________________________ _____________      _________ ______________________________ _____________      I understand that the above mentioned service(s) or supply may not be covered by my insurance company. I agree to be financially responsible for the cost of this service or supply in the event of my insurance denies payment as a non-covered benefit.        ______________________________________________________________________  Signature of Patient or Patient's Representative  Relationship  Date    ______________________________________________________________________  Signature of Witness to signing of form   Printed Name

## (undated) NOTE — LETTER
[address]   Date:   7/21/2021     Name:   Stephenie Hart    YOB: 1975   MRN:   [id number]       WHERE IS YOUR PAIN NOW? Anand the areas on your body where you feel the described sensations.   Use the appropriate sy

## (undated) NOTE — Clinical Note
Dear Ingris Capellan,    I had the opportunity to see your patient Milton Coughlin recently. I am sending you this update, and I appreciate your confidence in me to care for your patients.  Please feel free call me with any questions at [phone] or contact me t

## (undated) NOTE — LETTER
[address] [facility]   Date:   9/15/2021     Name:   Paige Ramsey    YOB: 1975   MRN:   [id number]       WHERE IS YOUR PAIN NOW?   Anand the areas on your body where you feel the descri

## (undated) NOTE — LETTER
[address]   Date:   7/21/2021     Name:   Emre Love    YOB: 1975   MRN:   [id number]       WHERE IS YOUR PAIN NOW? Anand the areas on your body where you feel the described sensations.   Use the appropriate sy

## (undated) NOTE — LETTER
Date: 2024      Patient Name: Jennifer Bah      : 3/16/1975        Thank you for choosing [facility] as your health care provider. Your physician has deemed the following medical service(s) necessary. However, your insurance plan may not pay for all of your health care and costs and may deny payment for this service. The fact that your insurance plan does not pay for an item or service does not mean you should not receive it. The purpose of this form is to help you make an informed decision about whether or not you want to receive this service(s) that may not be paid for by your insurance plan.    CPT Code Description     Cost     _________ RIGHT shoulder injection    _________ ______________________________ _____________      _________ ______________________________ _____________      I understand that the above mentioned service(s) or supply may not be covered by my insurance company. I agree to be financially responsible for the cost of this service or supply in the event of my insurance denies payment as a non-covered benefit.        ______________________________________________________________________  Signature of Patient or Patient's Representative  Relationship  Date    ______________________________________________________________________  Signature of Witness to signing of form   Printed Name

## (undated) NOTE — LETTER
[address], [place]  Authorization for Invasive Procedures  1.  I hereby authorize Dr. Syed Hoang , my physician and whomever may be designated as the doctor's assistant, to perform the following operation and/or procedure:  Marrion Rubinstein performed for the purposes of advancing medicine, science, and/or education, provided my identity is not revealed. If the procedure has been videotaped, the physician/surgeon will obtain the original videotape.  The hospital will not be responsible for stor My signature below affirms that prior to the time of the procedure, I have explained to the patient and/or her legal representative, the risks and benefits involved in the proposed treatment and any reasonable alternative to the proposed treatment.  I have

## (undated) NOTE — LETTER
[facility]   Date:   2/21/2024     Name:   Jennifer Bah    YOB: 1975   MRN:   [id number]       WHERE IS YOUR PAIN NOW?  Anand the areas on your body where you feel the described sensations.  Use the appropriate symbol.  Anand the areas of radiation.  Include all affected areas.  Just to complete the picture, please draw in the face.     ACHE:  ^ ^ ^   NUMBNESS:  0000   PINS & NEEDLES:  = = = =                              ^ ^ ^                       0000              = = = =                                    ^ ^ ^                       0000            = = = =      BURNING:  XXXX   STABBING: ////                  XXXX                ////                         XXXX          ////     Please anand the line below indicating your degree of pain right now  with 0 being no pain 10 being the worst pain possible.                                         0             1             2              3             4              5              6              7             8             9             10         Patient Signature:

## (undated) NOTE — LETTER
AUTHORIZATION FOR SURGICAL OPERATION OR OTHER PROCEDURE    1. I hereby authorize Dr. Humberto Acosta and the [facility] staff assigned to my case to perform the following operation and/or procedure at the [facility]:  Right knee bursa aspiration __    2.  My physician has explained the nature and purpose of the operation or other procedure, possible alternative methods of treatment, the risks involved, and the possibility of complication to me.  I acknowledge that no guarantee has been made as to the result that may be obtained.  3.  I recognize that, during the course of this operation, or other procedure, unforseen conditions may necessitate additional or different procedure than those listed above.  I, therefore, further authorize and request that the above named physician, his/her physician assistants or designees perform such procedures as are, in his/her professional opinion, necessary and desirable.  4.  Any tissue or organs removed in the operation or other procedure may be disposed of by and at the discretion of the [facility] staff and [facility].  5.  I understand that in the event of a medical emergency, I will be transported by local paramedics to [facility] or other hospital emergency department.  6.  I certify that I have read and fully understand the above consent to operation and/or other procedure.    7.  I acknowledge that my physician has explained sedation/analgesia administration to me including the risks and benefits.  I consent to the administration of sedation/analgesia as may be necessary or desirable in the judgement of my physician.    Witness signature: ___________________________________________________ Date:  ______/______/_____                    Time:  ________ A.M.  P.M.       Patient Name:  Jennifer Bah [id number] 3/16/1975          (please print)       Patient signature:  ___________________________________________________      Incompetent  signature:  _______________________________________________    Statement of Physician  My signature below affirms that prior to the time of the procedure, I have explained to the patient and/or his/her guardian, the risks and benefits involved in the proposed treatment and any reasonable alternative to the proposed treatment.  I have also explained the risks and benefits involved in the refusal of the proposed treatment and have answered the patient's questions.                        Date:  ______/______/_______  Provider                      Signature:  __________________________________________________________       Time:  ___________ A.FELICITY    P.MMarbin

## (undated) NOTE — LETTER
WHERE IS YOUR PAIN NOW?  Veda the areas on your body where you feel the described sensations.  Use the appropriate symbol.  Veda the areas of radiation.  Include all affected areas.  Just to complete the picture, please draw in the face.     ACHE:  ^ ^ ^   NUMBNESS:  0000   PINS & NEEDLES:  = = = =                              ^ ^ ^                       0000              = = = =                                    ^ ^ ^                       0000            = = = =      BURNING:  XXXX   STABBING: ////                  XXXX                ////                         XXXX          ////     Please veda the line below indicating your degree of pain right now  with 0 being no pain 10 being the worst pain possible.                                         0             1             2              3             4              5              6              7             8             9             10         Patient Signature:

## (undated) NOTE — LETTER
[address], [place], [place]     AUTHORIZATION FOR SURGICAL OPERATION OR PROCEDURE    I hereby authorize Dr. Didi Singh, my Physician(s) and whomever may be designated as the doctor's Assistant, to perform the following opera 4. I consent to the photographing of procedure(s) to be performed for the purposes of advancing medicine, science and/or education, provided my identity is not revealed.  If the procedure has been videotaped, the physician/surgeon will obtain the original v (Witness signature)                                                                                                  (Date)                                (Time)  STATEMENT OF PHYSICIAN My signature below affirms that prior to the time of the procedure;  I

## (undated) NOTE — LETTER
[address]   Date:   7/21/2021     Name:   Enrike Benites    YOB: 1975   MRN:   [id number]       WHERE IS YOUR PAIN NOW? Anand the areas on your body where you feel the described sensations.   Use the appropriate sy

## (undated) NOTE — LETTER
AUTHORIZATION FOR SURGICAL OPERATION OR OTHER PROCEDURE    1. I hereby authorize Dr. Humberto Acosta and the [facility] staff assigned to my case to perform the following operation and/or procedure at the [facility]:    RIGHT shoulder injection    2.  My physician has explained the nature and purpose of the operation or other procedure, possible alternative methods of treatment, the risks involved, and the possibility of complication to me.  I acknowledge that no guarantee has been made as to the result that may be obtained.  3.  I recognize that, during the course of this operation, or other procedure, unforseen conditions may necessitate additional or different procedure than those listed above.  I, therefore, further authorize and request that the above named physician, his/her physician assistants or designees perform such procedures as are, in his/her professional opinion, necessary and desirable.  4.  Any tissue or organs removed in the operation or other procedure may be disposed of by and at the discretion of the [facility] staff and [facility].  5.  I understand that in the event of a medical emergency, I will be transported by local paramedics to [facility] or other hospital emergency department.  6.  I certify that I have read and fully understand the above consent to operation and/or other procedure.    7.  I acknowledge that my physician has explained sedation/analgesia administration to me including the risks and benefits.  I consent to the administration of sedation/analgesia as may be necessary or desirable in the judgement of my physician.    Witness signature: ___________________________________________________ Date:  ______/______/_____                    Time:  ________ A.M.  P.M.       Patient Name:  Jennifer Bah  3/16/1975  [id number]         Patient signature:  ___________________________________________________                Statement of Physician  My  signature below affirms that prior to the time of the procedure, I have explained to the patient and/or his/her guardian, the risks and benefits involved in the proposed treatment and any reasonable alternative to the proposed treatment.  I have also explained the risks and benefits involved in the refusal of the proposed treatment and have answered the patient's questions.                        Date:  ______/______/_______  Provider                      Signature:  __________________________________________________________       Time:  ___________ A.M    P.M.